# Patient Record
Sex: MALE | Race: WHITE | NOT HISPANIC OR LATINO | Employment: FULL TIME | URBAN - METROPOLITAN AREA
[De-identification: names, ages, dates, MRNs, and addresses within clinical notes are randomized per-mention and may not be internally consistent; named-entity substitution may affect disease eponyms.]

---

## 2017-11-07 ENCOUNTER — OFFICE VISIT (OUTPATIENT)
Dept: URGENT CARE | Facility: PHYSICIAN GROUP | Age: 57
End: 2017-11-07
Payer: COMMERCIAL

## 2017-11-07 VITALS
OXYGEN SATURATION: 98 % | HEIGHT: 69 IN | HEART RATE: 64 BPM | DIASTOLIC BLOOD PRESSURE: 64 MMHG | SYSTOLIC BLOOD PRESSURE: 112 MMHG | WEIGHT: 148 LBS | RESPIRATION RATE: 16 BRPM | BODY MASS INDEX: 21.92 KG/M2 | TEMPERATURE: 98.9 F

## 2017-11-07 DIAGNOSIS — R19.7 DIARRHEA, UNSPECIFIED TYPE: ICD-10-CM

## 2017-11-07 DIAGNOSIS — J06.9 VIRAL URI WITH COUGH: ICD-10-CM

## 2017-11-07 PROCEDURE — 99203 OFFICE O/P NEW LOW 30 MIN: CPT | Performed by: PHYSICIAN ASSISTANT

## 2017-11-07 ASSESSMENT — ENCOUNTER SYMPTOMS
MYALGIAS: 0
ABDOMINAL PAIN: 0
SORE THROAT: 0
DIZZINESS: 0
CHILLS: 0
SHORTNESS OF BREATH: 0
SINUS PAIN: 0
COUGH: 1
VOMITING: 0
DIARRHEA: 1
FEVER: 0
CONSTIPATION: 0
SPUTUM PRODUCTION: 0
BLOOD IN STOOL: 0
NAUSEA: 1
MUSCULOSKELETAL NEGATIVE: 1

## 2017-11-07 ASSESSMENT — PAIN SCALES - GENERAL: PAINLEVEL: NO PAIN

## 2017-11-07 NOTE — LETTER
November 7, 2017         Patient: Gustavo Prieto   YOB: 1960   Date of Visit: 11/7/2017           To Whom it May Concern:    Gustavo Prieto was seen in my clinic on 11/7/2017. Please excuse his absence from 11/6/17-11/7/17.    If you have any questions or concerns, please don't hesitate to call.        Sincerely,           Sara Guzman P.A.-C.  Electronically Signed

## 2017-11-07 NOTE — PROGRESS NOTES
"Subjective:      Gustavo Prieto is a 57 y.o. male who presents with URI (sore throat, fever, bodyaches, diarrhea, nausea, x 2 days)            URI    This is a new problem. The current episode started in the past 7 days (2 days). The problem has been unchanged. There has been no fever. Associated symptoms include congestion, coughing, diarrhea and nausea. Pertinent negatives include no abdominal pain, chest pain, ear pain, plugged ear sensation, sinus pain, sore throat or vomiting. He has tried nothing for the symptoms.     Patient denies history of pneumonia. He is a current every day smoker. No fevers or chills. He states he has had some body aches and nausea (without vomiting) since symptoms started. His girlfriend is currently sick with similar symptoms. No recent antibiotic use. Patient is not concerned about diarrhea and denies recent travel, recent hospitalization, or history of c diff infections.     Review of Systems   Constitutional: Negative for chills and fever.   HENT: Positive for congestion. Negative for ear pain and sore throat.    Respiratory: Positive for cough. Negative for sputum production and shortness of breath.    Cardiovascular: Negative for chest pain.   Gastrointestinal: Positive for diarrhea and nausea. Negative for abdominal pain, blood in stool, constipation and vomiting.   Genitourinary: Negative.    Musculoskeletal: Negative.  Negative for myalgias.   Neurological: Negative for dizziness.        Objective:     /64   Pulse 64   Temp 37.2 °C (98.9 °F)   Resp 16   Ht 1.753 m (5' 9\")   Wt 67.1 kg (148 lb)   SpO2 98%   BMI 21.86 kg/m²      Physical Exam   Constitutional: He is oriented to person, place, and time. He appears well-developed and well-nourished. No distress.   HENT:   Head: Normocephalic and atraumatic.   Right Ear: Hearing, tympanic membrane, external ear and ear canal normal.   Left Ear: Hearing, tympanic membrane, external ear and ear canal normal. "   Mouth/Throat: Oropharynx is clear and moist. No oropharyngeal exudate, posterior oropharyngeal edema or posterior oropharyngeal erythema.   Eyes: Conjunctivae are normal. Pupils are equal, round, and reactive to light. Right eye exhibits no discharge. Left eye exhibits no discharge.   Neck: Normal range of motion.   Cardiovascular: Normal rate, regular rhythm and normal heart sounds.    No murmur heard.  Pulmonary/Chest: Effort normal and breath sounds normal. No respiratory distress. He has no wheezes. He has no rales.   Abdominal: Soft. Bowel sounds are normal. He exhibits no distension. There is no tenderness.   Musculoskeletal: Normal range of motion.   Lymphadenopathy:     He has no cervical adenopathy.   Neurological: He is alert and oriented to person, place, and time.   Skin: Skin is warm and dry. He is not diaphoretic.   Psychiatric: He has a normal mood and affect. His behavior is normal.   Nursing note and vitals reviewed.         .  PMH:  has a past medical history of S/P colostomy (CMS-HCC) (1/2014).  MEDS:   Current Outpatient Prescriptions:   •  oxycodone, immediate release, (ROXICODONE) 5 MG TABS, Take 1 Tab by mouth every four hours as needed for Mild Pain., Disp: 30 Tab, Rfl: 0  •  DSS (COLACE) 100 MG CAPS, Take 100 mg by mouth 2 times a day as needed for Constipation., Disp: 30 Cap, Rfl: 0  ALLERGIES: No Known Allergies  SURGHX:   Past Surgical History:   Procedure Laterality Date   • COLOSTOMY TAKEDOWN  6/9/2014    Performed by Leopoldo Shrestha M.D. at SURGERY [facility]   • EXPLORATORY LAPAROTOMY  1/15/2014    Performed by Leopoldo Shrestha M.D. at SURGERY [facility]   • COLON RESECTION  1/15/2014    Performed by Leopoldo Shrestha M.D. at SURGERY [facility]     SOCHX:  reports that he quit smoking about 3 years ago. He started smoking about 40 years ago. He has a 17.50 pack-year smoking history. He has never used smokeless tobacco. He reports that he drinks alcohol. He reports that  he uses drugs.  FH: family history includes Diabetes in his father; Heart Attack in his father.       Assessment/Plan:     1. Viral URI with cough  Advised patient symptoms are most likely viral in etiology, recommend supportive care. Increased fluids and rest. Patient declines cough medication at today's visit. Encouraged to take OTC cough suppressants as needed for symptomatic relief. Call or return to office if symptoms persist or worsen. Work note given at today's visit. The patient demonstrated a good understanding and agreed with the treatment plan.    2. Diarrhea, unspecified type

## 2022-03-09 ENCOUNTER — HOSPITAL ENCOUNTER (OUTPATIENT)
Facility: MEDICAL CENTER | Age: 62
End: 2022-03-11
Attending: EMERGENCY MEDICINE | Admitting: STUDENT IN AN ORGANIZED HEALTH CARE EDUCATION/TRAINING PROGRAM

## 2022-03-09 ENCOUNTER — APPOINTMENT (OUTPATIENT)
Dept: RADIOLOGY | Facility: MEDICAL CENTER | Age: 62
End: 2022-03-09
Attending: EMERGENCY MEDICINE

## 2022-03-09 DIAGNOSIS — R11.2 NON-INTRACTABLE VOMITING WITH NAUSEA, UNSPECIFIED VOMITING TYPE: ICD-10-CM

## 2022-03-09 DIAGNOSIS — E87.1 HYPONATREMIA: ICD-10-CM

## 2022-03-09 DIAGNOSIS — K52.9 GASTROENTERITIS: ICD-10-CM

## 2022-03-09 DIAGNOSIS — E86.0 DEHYDRATION: ICD-10-CM

## 2022-03-09 DIAGNOSIS — N17.9 AKI (ACUTE KIDNEY INJURY) (HCC): ICD-10-CM

## 2022-03-09 PROBLEM — A41.9 SEPSIS (HCC): Status: ACTIVE | Noted: 2022-03-09

## 2022-03-09 PROBLEM — E87.20 LACTIC ACIDOSIS: Status: ACTIVE | Noted: 2022-03-09

## 2022-03-09 PROBLEM — F12.10 MARIJUANA ABUSE: Status: ACTIVE | Noted: 2022-03-09

## 2022-03-09 LAB
ALBUMIN SERPL BCP-MCNC: 5.1 G/DL (ref 3.2–4.9)
ALBUMIN/GLOB SERPL: 1.6 G/DL
ALP SERPL-CCNC: 101 U/L (ref 30–99)
ALT SERPL-CCNC: 17 U/L (ref 2–50)
ANION GAP SERPL CALC-SCNC: 20 MMOL/L (ref 7–16)
APPEARANCE UR: CLEAR
AST SERPL-CCNC: 20 U/L (ref 12–45)
BACTERIA #/AREA URNS HPF: NEGATIVE /HPF
BASOPHILS # BLD AUTO: 0.2 % (ref 0–1.8)
BASOPHILS # BLD: 0.04 K/UL (ref 0–0.12)
BILIRUB SERPL-MCNC: 1 MG/DL (ref 0.1–1.5)
BILIRUB UR QL STRIP.AUTO: NEGATIVE
BUN SERPL-MCNC: 29 MG/DL (ref 8–22)
CALCIUM SERPL-MCNC: 10.5 MG/DL (ref 8.5–10.5)
CHLORIDE SERPL-SCNC: 96 MMOL/L (ref 96–112)
CO2 SERPL-SCNC: 14 MMOL/L (ref 20–33)
COLOR UR: YELLOW
CREAT SERPL-MCNC: 1.74 MG/DL (ref 0.5–1.4)
EOSINOPHIL # BLD AUTO: 0.04 K/UL (ref 0–0.51)
EOSINOPHIL NFR BLD: 0.2 % (ref 0–6.9)
EPI CELLS #/AREA URNS HPF: NEGATIVE /HPF
ERYTHROCYTE [DISTWIDTH] IN BLOOD BY AUTOMATED COUNT: 45.1 FL (ref 35.9–50)
GLOBULIN SER CALC-MCNC: 3.1 G/DL (ref 1.9–3.5)
GLUCOSE SERPL-MCNC: 116 MG/DL (ref 65–99)
GLUCOSE UR STRIP.AUTO-MCNC: NEGATIVE MG/DL
HCT VFR BLD AUTO: 49.9 % (ref 42–52)
HGB BLD-MCNC: 17.3 G/DL (ref 14–18)
HYALINE CASTS #/AREA URNS LPF: ABNORMAL /LPF
IMM GRANULOCYTES # BLD AUTO: 0.07 K/UL (ref 0–0.11)
IMM GRANULOCYTES NFR BLD AUTO: 0.4 % (ref 0–0.9)
INR PPP: 1.23 (ref 0.87–1.13)
KETONES UR STRIP.AUTO-MCNC: NEGATIVE MG/DL
LACTATE BLD-SCNC: 1.7 MMOL/L (ref 0.5–2)
LACTATE BLD-SCNC: 3 MMOL/L (ref 0.5–2)
LACTATE BLD-SCNC: 8.1 MMOL/L (ref 0.5–2)
LEUKOCYTE ESTERASE UR QL STRIP.AUTO: NEGATIVE
LIPASE SERPL-CCNC: 82 U/L (ref 11–82)
LYMPHOCYTES # BLD AUTO: 4.11 K/UL (ref 1–4.8)
LYMPHOCYTES NFR BLD: 23 % (ref 22–41)
MCH RBC QN AUTO: 29.7 PG (ref 27–33)
MCHC RBC AUTO-ENTMCNC: 34.7 G/DL (ref 33.7–35.3)
MCV RBC AUTO: 85.6 FL (ref 81.4–97.8)
MICRO URNS: ABNORMAL
MONOCYTES # BLD AUTO: 1.65 K/UL (ref 0–0.85)
MONOCYTES NFR BLD AUTO: 9.2 % (ref 0–13.4)
NEUTROPHILS # BLD AUTO: 11.93 K/UL (ref 1.82–7.42)
NEUTROPHILS NFR BLD: 67 % (ref 44–72)
NITRITE UR QL STRIP.AUTO: NEGATIVE
NRBC # BLD AUTO: 0 K/UL
NRBC BLD-RTO: 0 /100 WBC
PH UR STRIP.AUTO: 5.5 [PH] (ref 5–8)
PLATELET # BLD AUTO: 445 K/UL (ref 164–446)
PMV BLD AUTO: 9.8 FL (ref 9–12.9)
POTASSIUM SERPL-SCNC: 4.9 MMOL/L (ref 3.6–5.5)
PROT SERPL-MCNC: 8.2 G/DL (ref 6–8.2)
PROT UR QL STRIP: NEGATIVE MG/DL
PROTHROMBIN TIME: 15.2 SEC (ref 12–14.6)
RBC # BLD AUTO: 5.83 M/UL (ref 4.7–6.1)
RBC # URNS HPF: ABNORMAL /HPF
RBC UR QL AUTO: ABNORMAL
SODIUM SERPL-SCNC: 130 MMOL/L (ref 135–145)
SP GR UR STRIP.AUTO: >=1.045
UROBILINOGEN UR STRIP.AUTO-MCNC: 0.2 MG/DL
WBC # BLD AUTO: 17.8 K/UL (ref 4.8–10.8)
WBC #/AREA URNS HPF: ABNORMAL /HPF

## 2022-03-09 PROCEDURE — 80053 COMPREHEN METABOLIC PANEL: CPT

## 2022-03-09 PROCEDURE — 96372 THER/PROPH/DIAG INJ SC/IM: CPT

## 2022-03-09 PROCEDURE — 700102 HCHG RX REV CODE 250 W/ 637 OVERRIDE(OP): Performed by: STUDENT IN AN ORGANIZED HEALTH CARE EDUCATION/TRAINING PROGRAM

## 2022-03-09 PROCEDURE — 700105 HCHG RX REV CODE 258: Performed by: STUDENT IN AN ORGANIZED HEALTH CARE EDUCATION/TRAINING PROGRAM

## 2022-03-09 PROCEDURE — 85025 COMPLETE CBC W/AUTO DIFF WBC: CPT

## 2022-03-09 PROCEDURE — 700101 HCHG RX REV CODE 250: Performed by: EMERGENCY MEDICINE

## 2022-03-09 PROCEDURE — 96365 THER/PROPH/DIAG IV INF INIT: CPT

## 2022-03-09 PROCEDURE — 36415 COLL VENOUS BLD VENIPUNCTURE: CPT

## 2022-03-09 PROCEDURE — 83605 ASSAY OF LACTIC ACID: CPT | Mod: 91

## 2022-03-09 PROCEDURE — 85610 PROTHROMBIN TIME: CPT

## 2022-03-09 PROCEDURE — 700111 HCHG RX REV CODE 636 W/ 250 OVERRIDE (IP): Performed by: EMERGENCY MEDICINE

## 2022-03-09 PROCEDURE — A9270 NON-COVERED ITEM OR SERVICE: HCPCS | Performed by: STUDENT IN AN ORGANIZED HEALTH CARE EDUCATION/TRAINING PROGRAM

## 2022-03-09 PROCEDURE — G0378 HOSPITAL OBSERVATION PER HR: HCPCS

## 2022-03-09 PROCEDURE — 74177 CT ABD & PELVIS W/CONTRAST: CPT

## 2022-03-09 PROCEDURE — 96375 TX/PRO/DX INJ NEW DRUG ADDON: CPT

## 2022-03-09 PROCEDURE — 81001 URINALYSIS AUTO W/SCOPE: CPT

## 2022-03-09 PROCEDURE — 83690 ASSAY OF LIPASE: CPT

## 2022-03-09 PROCEDURE — 99406 BEHAV CHNG SMOKING 3-10 MIN: CPT | Performed by: STUDENT IN AN ORGANIZED HEALTH CARE EDUCATION/TRAINING PROGRAM

## 2022-03-09 PROCEDURE — 700117 HCHG RX CONTRAST REV CODE 255: Performed by: EMERGENCY MEDICINE

## 2022-03-09 PROCEDURE — 99285 EMERGENCY DEPT VISIT HI MDM: CPT

## 2022-03-09 PROCEDURE — 700105 HCHG RX REV CODE 258: Performed by: EMERGENCY MEDICINE

## 2022-03-09 PROCEDURE — 71045 X-RAY EXAM CHEST 1 VIEW: CPT

## 2022-03-09 PROCEDURE — 99220 PR INITIAL OBSERVATION CARE,LEVL III: CPT | Mod: 25 | Performed by: STUDENT IN AN ORGANIZED HEALTH CARE EDUCATION/TRAINING PROGRAM

## 2022-03-09 PROCEDURE — 700111 HCHG RX REV CODE 636 W/ 250 OVERRIDE (IP): Performed by: STUDENT IN AN ORGANIZED HEALTH CARE EDUCATION/TRAINING PROGRAM

## 2022-03-09 PROCEDURE — 87040 BLOOD CULTURE FOR BACTERIA: CPT

## 2022-03-09 RX ORDER — HEPARIN SODIUM 5000 [USP'U]/ML
5000 INJECTION, SOLUTION INTRAVENOUS; SUBCUTANEOUS EVERY 8 HOURS
Status: DISCONTINUED | OUTPATIENT
Start: 2022-03-09 | End: 2022-03-11 | Stop reason: HOSPADM

## 2022-03-09 RX ORDER — ACETAMINOPHEN 325 MG/1
650 TABLET ORAL EVERY 6 HOURS PRN
Status: DISCONTINUED | OUTPATIENT
Start: 2022-03-09 | End: 2022-03-11 | Stop reason: HOSPADM

## 2022-03-09 RX ORDER — ONDANSETRON 2 MG/ML
4 INJECTION INTRAMUSCULAR; INTRAVENOUS EVERY 4 HOURS PRN
Status: DISCONTINUED | OUTPATIENT
Start: 2022-03-09 | End: 2022-03-11 | Stop reason: HOSPADM

## 2022-03-09 RX ORDER — BISACODYL 10 MG
10 SUPPOSITORY, RECTAL RECTAL
Status: DISCONTINUED | OUTPATIENT
Start: 2022-03-09 | End: 2022-03-11 | Stop reason: HOSPADM

## 2022-03-09 RX ORDER — ONDANSETRON 4 MG/1
4 TABLET, ORALLY DISINTEGRATING ORAL EVERY 4 HOURS PRN
Status: DISCONTINUED | OUTPATIENT
Start: 2022-03-09 | End: 2022-03-11 | Stop reason: HOSPADM

## 2022-03-09 RX ORDER — ONDANSETRON 2 MG/ML
4 INJECTION INTRAMUSCULAR; INTRAVENOUS ONCE
Status: COMPLETED | OUTPATIENT
Start: 2022-03-09 | End: 2022-03-09

## 2022-03-09 RX ORDER — METRONIDAZOLE 500 MG/1
500 TABLET ORAL EVERY 8 HOURS
Status: DISCONTINUED | OUTPATIENT
Start: 2022-03-10 | End: 2022-03-11 | Stop reason: HOSPADM

## 2022-03-09 RX ORDER — PROCHLORPERAZINE EDISYLATE 5 MG/ML
5-10 INJECTION INTRAMUSCULAR; INTRAVENOUS EVERY 4 HOURS PRN
Status: DISCONTINUED | OUTPATIENT
Start: 2022-03-09 | End: 2022-03-11 | Stop reason: HOSPADM

## 2022-03-09 RX ORDER — GUAIFENESIN/DEXTROMETHORPHAN 100-10MG/5
10 SYRUP ORAL EVERY 6 HOURS PRN
Status: DISCONTINUED | OUTPATIENT
Start: 2022-03-09 | End: 2022-03-11 | Stop reason: HOSPADM

## 2022-03-09 RX ORDER — POLYETHYLENE GLYCOL 3350 17 G/17G
1 POWDER, FOR SOLUTION ORAL
Status: DISCONTINUED | OUTPATIENT
Start: 2022-03-09 | End: 2022-03-11 | Stop reason: HOSPADM

## 2022-03-09 RX ORDER — PROMETHAZINE HYDROCHLORIDE 25 MG/1
12.5-25 SUPPOSITORY RECTAL EVERY 4 HOURS PRN
Status: DISCONTINUED | OUTPATIENT
Start: 2022-03-09 | End: 2022-03-11 | Stop reason: HOSPADM

## 2022-03-09 RX ORDER — MORPHINE SULFATE 4 MG/ML
4 INJECTION INTRAVENOUS ONCE
Status: COMPLETED | OUTPATIENT
Start: 2022-03-09 | End: 2022-03-09

## 2022-03-09 RX ORDER — LABETALOL HYDROCHLORIDE 5 MG/ML
10 INJECTION, SOLUTION INTRAVENOUS EVERY 4 HOURS PRN
Status: DISCONTINUED | OUTPATIENT
Start: 2022-03-09 | End: 2022-03-11 | Stop reason: HOSPADM

## 2022-03-09 RX ORDER — SODIUM CHLORIDE, SODIUM LACTATE, POTASSIUM CHLORIDE, AND CALCIUM CHLORIDE .6; .31; .03; .02 G/100ML; G/100ML; G/100ML; G/100ML
30 INJECTION, SOLUTION INTRAVENOUS ONCE
Status: COMPLETED | OUTPATIENT
Start: 2022-03-09 | End: 2022-03-09

## 2022-03-09 RX ORDER — PROMETHAZINE HYDROCHLORIDE 25 MG/1
12.5-25 TABLET ORAL EVERY 4 HOURS PRN
Status: DISCONTINUED | OUTPATIENT
Start: 2022-03-09 | End: 2022-03-11 | Stop reason: HOSPADM

## 2022-03-09 RX ORDER — AMOXICILLIN 250 MG
2 CAPSULE ORAL 2 TIMES DAILY
Status: DISCONTINUED | OUTPATIENT
Start: 2022-03-09 | End: 2022-03-11 | Stop reason: HOSPADM

## 2022-03-09 RX ORDER — SODIUM CHLORIDE, SODIUM LACTATE, POTASSIUM CHLORIDE, CALCIUM CHLORIDE 600; 310; 30; 20 MG/100ML; MG/100ML; MG/100ML; MG/100ML
INJECTION, SOLUTION INTRAVENOUS CONTINUOUS
Status: DISCONTINUED | OUTPATIENT
Start: 2022-03-09 | End: 2022-03-11

## 2022-03-09 RX ORDER — CEFTRIAXONE 2 G/1
2 INJECTION, POWDER, FOR SOLUTION INTRAMUSCULAR; INTRAVENOUS ONCE
Status: COMPLETED | OUTPATIENT
Start: 2022-03-09 | End: 2022-03-09

## 2022-03-09 RX ORDER — SODIUM CHLORIDE, SODIUM LACTATE, POTASSIUM CHLORIDE, AND CALCIUM CHLORIDE .6; .31; .03; .02 G/100ML; G/100ML; G/100ML; G/100ML
500 INJECTION, SOLUTION INTRAVENOUS
Status: DISCONTINUED | OUTPATIENT
Start: 2022-03-09 | End: 2022-03-11 | Stop reason: HOSPADM

## 2022-03-09 RX ADMIN — ONDANSETRON 4 MG: 2 INJECTION INTRAMUSCULAR; INTRAVENOUS at 20:16

## 2022-03-09 RX ADMIN — SODIUM CHLORIDE, POTASSIUM CHLORIDE, SODIUM LACTATE AND CALCIUM CHLORIDE 1899 ML: 600; 310; 30; 20 INJECTION, SOLUTION INTRAVENOUS at 20:15

## 2022-03-09 RX ADMIN — MORPHINE SULFATE 4 MG: 4 INJECTION INTRAVENOUS at 20:16

## 2022-03-09 RX ADMIN — SODIUM CHLORIDE, POTASSIUM CHLORIDE, SODIUM LACTATE AND CALCIUM CHLORIDE: 600; 310; 30; 20 INJECTION, SOLUTION INTRAVENOUS at 23:46

## 2022-03-09 RX ADMIN — METRONIDAZOLE 500 MG: 500 INJECTION, SOLUTION INTRAVENOUS at 20:15

## 2022-03-09 RX ADMIN — CEFTRIAXONE SODIUM 2 G: 2 INJECTION, POWDER, FOR SOLUTION INTRAMUSCULAR; INTRAVENOUS at 20:15

## 2022-03-09 RX ADMIN — ONDANSETRON 4 MG: 4 TABLET, ORALLY DISINTEGRATING ORAL at 23:44

## 2022-03-09 RX ADMIN — HEPARIN SODIUM 5000 UNITS: 5000 INJECTION, SOLUTION INTRAVENOUS; SUBCUTANEOUS at 23:45

## 2022-03-09 RX ADMIN — PROMETHAZINE HYDROCHLORIDE 25 MG: 25 TABLET ORAL at 23:55

## 2022-03-09 RX ADMIN — IOHEXOL 80 ML: 350 INJECTION, SOLUTION INTRAVENOUS at 20:52

## 2022-03-09 ASSESSMENT — ENCOUNTER SYMPTOMS
FALLS: 0
BLOOD IN STOOL: 0
PALPITATIONS: 0
HEADACHES: 0
COUGH: 0
CHILLS: 0
FLANK PAIN: 0
DOUBLE VISION: 0
VOMITING: 1
NAUSEA: 1
DIARRHEA: 0
CONSTIPATION: 0
HEARTBURN: 1
SHORTNESS OF BREATH: 0
DIZZINESS: 0
BRUISES/BLEEDS EASILY: 0
DEPRESSION: 0
MYALGIAS: 0
BLURRED VISION: 0
FEVER: 1
FOCAL WEAKNESS: 0
ABDOMINAL PAIN: 1
WEAKNESS: 0

## 2022-03-09 ASSESSMENT — LIFESTYLE VARIABLES: SUBSTANCE_ABUSE: 1

## 2022-03-10 PROBLEM — N17.9 AKI (ACUTE KIDNEY INJURY) (HCC): Status: ACTIVE | Noted: 2022-03-10

## 2022-03-10 PROBLEM — E87.1 HYPONATREMIA: Status: ACTIVE | Noted: 2022-03-10

## 2022-03-10 LAB
ALBUMIN SERPL BCP-MCNC: 4 G/DL (ref 3.2–4.9)
ALBUMIN/GLOB SERPL: 1.6 G/DL
ALP SERPL-CCNC: 78 U/L (ref 30–99)
ALT SERPL-CCNC: 14 U/L (ref 2–50)
ANION GAP SERPL CALC-SCNC: 14 MMOL/L (ref 7–16)
AST SERPL-CCNC: 16 U/L (ref 12–45)
BASOPHILS # BLD AUTO: 0.2 % (ref 0–1.8)
BASOPHILS # BLD: 0.03 K/UL (ref 0–0.12)
BILIRUB SERPL-MCNC: 0.7 MG/DL (ref 0.1–1.5)
BUN SERPL-MCNC: 24 MG/DL (ref 8–22)
CALCIUM SERPL-MCNC: 8.6 MG/DL (ref 8.5–10.5)
CHLORIDE SERPL-SCNC: 100 MMOL/L (ref 96–112)
CO2 SERPL-SCNC: 18 MMOL/L (ref 20–33)
CREAT SERPL-MCNC: 1.28 MG/DL (ref 0.5–1.4)
EOSINOPHIL # BLD AUTO: 0.03 K/UL (ref 0–0.51)
EOSINOPHIL NFR BLD: 0.2 % (ref 0–6.9)
ERYTHROCYTE [DISTWIDTH] IN BLOOD BY AUTOMATED COUNT: 46 FL (ref 35.9–50)
FLUAV RNA SPEC QL NAA+PROBE: NEGATIVE
FLUBV RNA SPEC QL NAA+PROBE: NEGATIVE
GLOBULIN SER CALC-MCNC: 2.5 G/DL (ref 1.9–3.5)
GLUCOSE SERPL-MCNC: 112 MG/DL (ref 65–99)
HCT VFR BLD AUTO: 43.6 % (ref 42–52)
HGB BLD-MCNC: 15 G/DL (ref 14–18)
IMM GRANULOCYTES # BLD AUTO: 0.1 K/UL (ref 0–0.11)
IMM GRANULOCYTES NFR BLD AUTO: 0.6 % (ref 0–0.9)
LACTATE BLD-SCNC: 1.4 MMOL/L (ref 0.5–2)
LACTATE BLD-SCNC: 1.8 MMOL/L (ref 0.5–2)
LYMPHOCYTES # BLD AUTO: 2.89 K/UL (ref 1–4.8)
LYMPHOCYTES NFR BLD: 17.3 % (ref 22–41)
MAGNESIUM SERPL-MCNC: 1.9 MG/DL (ref 1.5–2.5)
MCH RBC QN AUTO: 30.1 PG (ref 27–33)
MCHC RBC AUTO-ENTMCNC: 34.4 G/DL (ref 33.7–35.3)
MCV RBC AUTO: 87.4 FL (ref 81.4–97.8)
MONOCYTES # BLD AUTO: 1.54 K/UL (ref 0–0.85)
MONOCYTES NFR BLD AUTO: 9.2 % (ref 0–13.4)
NEUTROPHILS # BLD AUTO: 12.12 K/UL (ref 1.82–7.42)
NEUTROPHILS NFR BLD: 72.5 % (ref 44–72)
NRBC # BLD AUTO: 0 K/UL
NRBC BLD-RTO: 0 /100 WBC
PHOSPHATE SERPL-MCNC: 3.8 MG/DL (ref 2.5–4.5)
PLATELET # BLD AUTO: 355 K/UL (ref 164–446)
PMV BLD AUTO: 10 FL (ref 9–12.9)
POTASSIUM SERPL-SCNC: 4.2 MMOL/L (ref 3.6–5.5)
PROCALCITONIN SERPL-MCNC: <0.05 NG/ML
PROT SERPL-MCNC: 6.5 G/DL (ref 6–8.2)
RBC # BLD AUTO: 4.99 M/UL (ref 4.7–6.1)
SARS-COV-2 RNA RESP QL NAA+PROBE: NOTDETECTED
SODIUM SERPL-SCNC: 132 MMOL/L (ref 135–145)
SPECIMEN SOURCE: NORMAL
WBC # BLD AUTO: 16.7 K/UL (ref 4.8–10.8)

## 2022-03-10 PROCEDURE — C9803 HOPD COVID-19 SPEC COLLECT: HCPCS | Performed by: INTERNAL MEDICINE

## 2022-03-10 PROCEDURE — G0378 HOSPITAL OBSERVATION PER HR: HCPCS

## 2022-03-10 PROCEDURE — 700105 HCHG RX REV CODE 258: Performed by: STUDENT IN AN ORGANIZED HEALTH CARE EDUCATION/TRAINING PROGRAM

## 2022-03-10 PROCEDURE — 96376 TX/PRO/DX INJ SAME DRUG ADON: CPT

## 2022-03-10 PROCEDURE — A9270 NON-COVERED ITEM OR SERVICE: HCPCS | Performed by: STUDENT IN AN ORGANIZED HEALTH CARE EDUCATION/TRAINING PROGRAM

## 2022-03-10 PROCEDURE — 96375 TX/PRO/DX INJ NEW DRUG ADDON: CPT

## 2022-03-10 PROCEDURE — 84145 PROCALCITONIN (PCT): CPT

## 2022-03-10 PROCEDURE — 83605 ASSAY OF LACTIC ACID: CPT

## 2022-03-10 PROCEDURE — 83735 ASSAY OF MAGNESIUM: CPT

## 2022-03-10 PROCEDURE — 96372 THER/PROPH/DIAG INJ SC/IM: CPT

## 2022-03-10 PROCEDURE — 700102 HCHG RX REV CODE 250 W/ 637 OVERRIDE(OP): Performed by: STUDENT IN AN ORGANIZED HEALTH CARE EDUCATION/TRAINING PROGRAM

## 2022-03-10 PROCEDURE — 80053 COMPREHEN METABOLIC PANEL: CPT

## 2022-03-10 PROCEDURE — 85025 COMPLETE CBC W/AUTO DIFF WBC: CPT

## 2022-03-10 PROCEDURE — 84100 ASSAY OF PHOSPHORUS: CPT

## 2022-03-10 PROCEDURE — 700111 HCHG RX REV CODE 636 W/ 250 OVERRIDE (IP): Performed by: STUDENT IN AN ORGANIZED HEALTH CARE EDUCATION/TRAINING PROGRAM

## 2022-03-10 PROCEDURE — 0240U HCHG SARS-COV-2 COVID-19 NFCT DS RESP RNA 3 TRGT MIC: CPT

## 2022-03-10 PROCEDURE — 99225 PR SUBSEQUENT OBSERVATION CARE,LEVEL II: CPT | Performed by: INTERNAL MEDICINE

## 2022-03-10 RX ORDER — NICOTINE 21 MG/24HR
21 PATCH, TRANSDERMAL 24 HOURS TRANSDERMAL
Status: DISCONTINUED | OUTPATIENT
Start: 2022-03-11 | End: 2022-03-11 | Stop reason: HOSPADM

## 2022-03-10 RX ADMIN — HEPARIN SODIUM 5000 UNITS: 5000 INJECTION, SOLUTION INTRAVENOUS; SUBCUTANEOUS at 13:34

## 2022-03-10 RX ADMIN — METRONIDAZOLE 500 MG: 500 TABLET ORAL at 06:26

## 2022-03-10 RX ADMIN — HEPARIN SODIUM 5000 UNITS: 5000 INJECTION, SOLUTION INTRAVENOUS; SUBCUTANEOUS at 21:46

## 2022-03-10 RX ADMIN — HEPARIN SODIUM 5000 UNITS: 5000 INJECTION, SOLUTION INTRAVENOUS; SUBCUTANEOUS at 06:26

## 2022-03-10 RX ADMIN — SODIUM CHLORIDE, POTASSIUM CHLORIDE, SODIUM LACTATE AND CALCIUM CHLORIDE: 600; 310; 30; 20 INJECTION, SOLUTION INTRAVENOUS at 12:20

## 2022-03-10 RX ADMIN — METRONIDAZOLE 500 MG: 500 TABLET ORAL at 21:47

## 2022-03-10 RX ADMIN — METRONIDAZOLE 500 MG: 500 TABLET ORAL at 13:34

## 2022-03-10 RX ADMIN — CEFTRIAXONE SODIUM 1 G: 10 INJECTION, POWDER, FOR SOLUTION INTRAVENOUS at 06:25

## 2022-03-10 RX ADMIN — PROCHLORPERAZINE EDISYLATE 10 MG: 5 INJECTION INTRAMUSCULAR; INTRAVENOUS at 02:55

## 2022-03-10 RX ADMIN — SODIUM CHLORIDE, POTASSIUM CHLORIDE, SODIUM LACTATE AND CALCIUM CHLORIDE: 600; 310; 30; 20 INJECTION, SOLUTION INTRAVENOUS at 21:42

## 2022-03-10 ASSESSMENT — COGNITIVE AND FUNCTIONAL STATUS - GENERAL
SUGGESTED CMS G CODE MODIFIER DAILY ACTIVITY: CH
DAILY ACTIVITIY SCORE: 24

## 2022-03-10 ASSESSMENT — PAIN DESCRIPTION - PAIN TYPE
TYPE: ACUTE PAIN
TYPE: ACUTE PAIN

## 2022-03-10 ASSESSMENT — PATIENT HEALTH QUESTIONNAIRE - PHQ9
1. LITTLE INTEREST OR PLEASURE IN DOING THINGS: NOT AT ALL
2. FEELING DOWN, DEPRESSED, IRRITABLE, OR HOPELESS: NOT AT ALL
SUM OF ALL RESPONSES TO PHQ9 QUESTIONS 1 AND 2: 0

## 2022-03-10 ASSESSMENT — LIFESTYLE VARIABLES
ALCOHOL_USE: YES
EVER HAD A DRINK FIRST THING IN THE MORNING TO STEADY YOUR NERVES TO GET RID OF A HANGOVER: NO
CONSUMPTION TOTAL: INCOMPLETE
TOTAL SCORE: 0
DOES PATIENT WANT TO STOP DRINKING: NO
HAVE YOU EVER FELT YOU SHOULD CUT DOWN ON YOUR DRINKING: NO
TOTAL SCORE: 0
EVER FELT BAD OR GUILTY ABOUT YOUR DRINKING: NO
HAVE PEOPLE ANNOYED YOU BY CRITICIZING YOUR DRINKING: NO
TOTAL SCORE: 0

## 2022-03-10 NOTE — PROGRESS NOTES
[facility] Medicine Daily Progress Note    Date of Service  3/10/2022    Chief Complaint  Gustavo Prieto is a 61 y.o. male admitted 3/9/2022 with Vomiting (For the last couple of days, haven't been able to keep anything done. ) and Chills (Started Monday)      Hospital Course  No notes on file  He has history of  prior diverticulitis, marijuana hyperemesis disorder, tobacco abuse.  He presents with Vomiting (For the last couple of days, haven't been able to keep anything done. ) and Chills (Started Monday)  At the ED, he is afebrile, sightly hypertensive  CT imagingshowed enteritis  Leukocytosis  He is admitted to observation for gastroenteritis      Interval Problem Update  3/10. He was feeling better but felt he wasn't at baseline. Blood cultures pending    I have personally seen and examined the patient at bedside. I discussed the plan of care with patient and bedside RN.    Consultants/Specialty      Code Status  Full Code    Disposition  Patient is medically cleared pending tolerating food, improved leukocytosis and blood culture results for discharge.   Anticipate discharge to to home with close outpatient follow-up.  I have placed the appropriate orders for post-discharge needs.    Review of Systems  Review of Systems   Constitutional: Positive for chills and malaise/fatigue.   Gastrointestinal: Positive for nausea and vomiting.        Physical Exam  Temp:  [36.2 °C (97.1 °F)] 36.2 °C (97.1 °F)  Pulse:  [59-91] 74  Resp:  [18] 18  BP: (130-154)/(75-93) 130/80  SpO2:  [93 %-98 %] 93 %    Physical Exam  Vitals and nursing note reviewed.   Constitutional:       Comments: malaised   HENT:      Head: Normocephalic and atraumatic.      Right Ear: External ear normal.      Left Ear: External ear normal.      Nose: Nose normal.      Mouth/Throat:      Mouth: Mucous membranes are moist.   Eyes:      General: No scleral icterus.     Conjunctiva/sclera: Conjunctivae normal.   Cardiovascular:      Rate and Rhythm:  Normal rate and regular rhythm.      Heart sounds: No murmur heard.    No friction rub. No gallop.   Pulmonary:      Effort: Pulmonary effort is normal.      Breath sounds: Normal breath sounds.   Abdominal:      General: Abdomen is flat. Bowel sounds are normal. There is no distension.      Palpations: Abdomen is soft.      Tenderness: There is no abdominal tenderness. There is no guarding.   Musculoskeletal:         General: Normal range of motion.      Cervical back: Normal range of motion and neck supple.   Skin:     General: Skin is warm.   Neurological:      Mental Status: He is alert and oriented to person, place, and time. Mental status is at baseline.   Psychiatric:         Mood and Affect: Mood normal.         Behavior: Behavior normal.         Thought Content: Thought content normal.         Judgment: Judgment normal.         Fluids    Intake/Output Summary (Last 24 hours) at 3/10/2022 0809  Last data filed at 3/10/2022 0300  Gross per 24 hour   Intake --   Output 500 ml   Net -500 ml       Laboratory  Recent Labs     03/09/22  1847 03/10/22  0255   WBC 17.8* 16.7*   RBC 5.83 4.99   HEMOGLOBIN 17.3 15.0   HEMATOCRIT 49.9 43.6   MCV 85.6 87.4   MCH 29.7 30.1   MCHC 34.7 34.4   RDW 45.1 46.0   PLATELETCT 445 355   MPV 9.8 10.0     Recent Labs     03/09/22  1847 03/10/22  0255   SODIUM 130* 132*   POTASSIUM 4.9 4.2   CHLORIDE 96 100   CO2 14* 18*   GLUCOSE 116* 112*   BUN 29* 24*   CREATININE 1.74* 1.28   CALCIUM 10.5 8.6     Recent Labs     03/09/22  2150   INR 1.23*               Imaging  CT-ABDOMEN-PELVIS WITH   Final Result      1.  Mildly dilated fluid-filled jejunal loops in the left upper quadrant may represent enteritis. No surrounding inflammation, pneumoperitoneum, or abscess is identified.      2.  Atherosclerosis      DX-CHEST-PORTABLE (1 VIEW)   Final Result      No evidence of acute cardiopulmonary process.           Assessment/Plan  * Sepsis, gastroenteritis (HCC)  Assessment & Plan  This is  "Sepsis Present on admission  SIRS criteria identified on my evaluation include: Leukocytosis, with WBC greater than 12,000 and Bandemia, greater than 10% bands  Source is GI  Sepsis protocol initiated  Fluid resuscitation ordered per protocol  Crystalloid Fluid Administration: Fluid resuscitation ordered per standard protocol - 30 mL/kg per current or ideal body weight  IV antibiotics as appropriate for source of sepsis  Reassessment: I have reassessed the patient's hemodynamic status\"    Continue antibiotics, switch to PO when he can tolerate food          Dehydration  Assessment & Plan  IVF    Lactic acidosis  Assessment & Plan  IVF, abx    Marijuana abuse  Assessment & Plan  Cessation advised    Intractable nausea and vomiting  Assessment & Plan  Supportive care  Discussed marijuana cessation    Gastroenteritis- (present on admission)  Assessment & Plan  CT AP: enteritis  IVF  Abx: ceftriaxone + flagyl  CLD - advance diet as tolerated\"    Advancing diet    Tobacco use- (present on admission)  Assessment & Plan  Admits to smoking 0.5 to 1ppd  Cessation counseled 5mins, education provided, declined offered nicotine patch/gum    Leukocytosis- (present on admission)  Assessment & Plan  Repeat in AM\"    Follow blood cultures  May discharge if leukocytosis trending down tomorrow         VTE prophylaxis: heparin ppx    I have performed a physical exam and reviewed and updated ROS and Plan today (3/10/2022). In review of yesterday's note (3/9/2022), there are no changes except as documented above.      "

## 2022-03-10 NOTE — ED TRIAGE NOTES
"Chief Complaint   Patient presents with   • Vomiting     For the last couple of days, haven't been able to keep anything done.    • Chills     Started Monday     Pt states he's had hyperemesis from cannabis use in the past but he also says stress has triggered it. Says he's been under a lot of stress recently. Smoked 3-4 days ago.     /93   Pulse 91   Temp 36.2 °C (97.1 °F) (Temporal)   Resp 18   Ht 1.753 m (5' 9\")   Wt 63.3 kg (139 lb 8.8 oz)   SpO2 98%   BMI 20.61 kg/m²     "

## 2022-03-10 NOTE — PROGRESS NOTES
Assumed care of pt. VSS on RA. Denies pain. Reports nausea. zofran, phenergan given. Lactate sent. LR initiated. Resting comfortably on stretcher. Call bell within reach, fall and safety precautions in place.

## 2022-03-10 NOTE — ASSESSMENT & PLAN NOTE
Cessation advised  Patient reports he formally consumed 1 g of marijuana daily, now is 1 g every 3 days.

## 2022-03-10 NOTE — ASSESSMENT & PLAN NOTE
"This is Sepsis Present on admission  SIRS criteria identified on my evaluation include: Leukocytosis, with WBC greater than 12,000 and Bandemia, greater than 10% bands  Source is GI  Sepsis protocol initiated  Fluid resuscitation ordered per protocol  Crystalloid Fluid Administration: Fluid resuscitation ordered per standard protocol - 30 mL/kg per current or ideal body weight  IV antibiotics as appropriate for source of sepsis  Reassessment: I have reassessed the patient's hemodynamic status\"    Improving  Advance diet as tolerated  "

## 2022-03-10 NOTE — ED NOTES
Received call from lab regarding critical result. Lactic 8.1. Read back and verified. ERP at bedside and made aware

## 2022-03-10 NOTE — H&P
Hospital Medicine History & Physical Note    Date of Service  3/9/2022    Primary Care Physician  Pcp Pt States None    Consultants  None    Code Status  Full Code    Chief Complaint  Chief Complaint   Patient presents with   • Vomiting     For the last couple of days, haven't been able to keep anything done.    • Chills     Started Monday       History of Presenting Illness  Gustavo Prieto is a 61 y.o. male with history of prior diverticulitis, marijuana hyperemesis disorder, tobacco abuse who presented 3/9/2022 with 3 days of nausea vomit abdominal discomfort.  Patient reported not having symptomatic relief, endorsed subjective fever at home.  Denies sick contact.  He reported being vaccinated for Covid with [employer] vaccine, also reported to have Pfizer booster.  However, he reported being sick with COVID approximately 2 weeks ago, but reported he has recovered since.  However due to nausea vomiting abdominal discomfort for the past 3 days decided come to ER for evaluation.  He reported consuming 1 g of marijuana daily, however recently reduced to 1 g every 3 days.  In ER, leukocytosis WBC of 17.8k, lactic acid 3.0.  CTAP noted enteritis, no diverticulitis.  Admitted to medicine service for further evaluation and treatment.    I discussed the plan of care with patient, family and bedside RN.    Review of Systems  Review of Systems   Constitutional: Positive for fever and malaise/fatigue. Negative for chills.   HENT: Negative for hearing loss and tinnitus.    Eyes: Negative for blurred vision and double vision.   Respiratory: Negative for cough and shortness of breath.    Cardiovascular: Negative for chest pain and palpitations.   Gastrointestinal: Positive for abdominal pain, heartburn, nausea and vomiting. Negative for blood in stool, constipation and diarrhea.   Genitourinary: Negative for dysuria and flank pain.   Musculoskeletal: Negative for falls and myalgias.   Skin: Negative for itching  and rash.   Neurological: Negative for dizziness, focal weakness, weakness and headaches.   Endo/Heme/Allergies: Negative for environmental allergies. Does not bruise/bleed easily.   Psychiatric/Behavioral: Positive for substance abuse (marijuana). Negative for depression and suicidal ideas.   All other systems reviewed and are negative.      Past Medical History   has a past medical history of S/P colostomy (CMS-HCC) ([facility]) (1/2014).    Surgical History   has a past surgical history that includes exploratory laparotomy (1/15/2014); colon resection (1/15/2014); and colostomy takedown (6/9/2014).     Family History  family history includes Diabetes in his father; Heart Attack in his father.   Family history reviewed with patient. There is no family history that is pertinent to the chief complaint.     Social History   reports that he quit smoking about 8 years ago. He started smoking about 44 years ago. He has a 17.50 pack-year smoking history. He has never used smokeless tobacco. He reports current alcohol use. He reports current drug use.    Allergies  No Known Allergies    Medications  Prior to Admission Medications   Prescriptions Last Dose Informant Patient Reported? Taking?   DSS (COLACE) 100 MG CAPS   No No   Sig: Take 100 mg by mouth 2 times a day as needed for Constipation.   oxycodone, immediate release, (ROXICODONE) 5 MG TABS   No No   Sig: Take 1 Tab by mouth every four hours as needed for Mild Pain.      Facility-Administered Medications: None       Physical Exam  Temp:  [36.2 °C (97.1 °F)] 36.2 °C (97.1 °F)  Pulse:  [91] 91  Resp:  [18] 18  BP: (154)/(93) 154/93  SpO2:  [98 %] 98 %  Blood Pressure: 154/93   Temperature: 36.2 °C (97.1 °F)   Pulse: 91   Respiration: 18   Pulse Oximetry: 98 %       Physical Exam  Vitals and nursing note reviewed.   Constitutional:       Appearance: Normal appearance.   HENT:      Head: Normocephalic and atraumatic.      Nose: Nose normal.      Mouth/Throat:      Mouth:  Mucous membranes are dry.      Pharynx: Oropharynx is clear.   Eyes:      General: No scleral icterus.     Extraocular Movements: Extraocular movements intact.   Cardiovascular:      Rate and Rhythm: Normal rate and regular rhythm.      Pulses: Normal pulses.      Heart sounds:     No friction rub.   Pulmonary:      Effort: Pulmonary effort is normal. No respiratory distress.      Breath sounds: No wheezing.   Abdominal:      General: Bowel sounds are normal. There is no distension.      Palpations: Abdomen is soft.      Tenderness: There is abdominal tenderness. There is no guarding or rebound.   Musculoskeletal:         General: No swelling or tenderness. Normal range of motion.      Cervical back: Neck supple. No tenderness.   Skin:     General: Skin is warm and dry.      Capillary Refill: Capillary refill takes less than 2 seconds.   Neurological:      General: No focal deficit present.      Mental Status: He is alert and oriented to person, place, and time.      Motor: No weakness.   Psychiatric:         Mood and Affect: Mood normal.         Laboratory:  Recent Labs     03/09/22  1847   WBC 17.8*   RBC 5.83   HEMOGLOBIN 17.3   HEMATOCRIT 49.9   MCV 85.6   MCH 29.7   MCHC 34.7   RDW 45.1   PLATELETCT 445   MPV 9.8     Recent Labs     03/09/22  1847   SODIUM 130*   POTASSIUM 4.9   CHLORIDE 96   CO2 14*   GLUCOSE 116*   BUN 29*   CREATININE 1.74*   CALCIUM 10.5     Recent Labs     03/09/22  1847   ALTSGPT 17   ASTSGOT 20   ALKPHOSPHAT 101*   TBILIRUBIN 1.0   LIPASE 82   GLUCOSE 116*     Recent Labs     03/09/22  2150   INR 1.23*     No results for input(s): NTPROBNP in the last 72 hours.      No results for input(s): TROPONINT in the last 72 hours.    Imaging:  CT-ABDOMEN-PELVIS WITH   Final Result      1.  Mildly dilated fluid-filled jejunal loops in the left upper quadrant may represent enteritis. No surrounding inflammation, pneumoperitoneum, or abscess is identified.      2.  Atherosclerosis       DX-CHEST-PORTABLE (1 VIEW)   Final Result      No evidence of acute cardiopulmonary process.          X-Ray:  I have personally reviewed the images and compared with prior images.    Assessment/Plan:  I anticipate this patient is appropriate for observation status at this time.    * Gastroenteritis- (present on admission)  Assessment & Plan  CT AP: enteritis  IVF  Abx: ceftriaxone + flagyl  CLD - advance diet as tolerated    Lactic acidosis  Assessment & Plan  IVF, abx    Sepsis (HCC)  Assessment & Plan  This is Sepsis Present on admission  SIRS criteria identified on my evaluation include: Leukocytosis, with WBC greater than 12,000 and Bandemia, greater than 10% bands  Source is GI  Sepsis protocol initiated  Fluid resuscitation ordered per protocol  Crystalloid Fluid Administration: Fluid resuscitation ordered per standard protocol - 30 mL/kg per current or ideal body weight  IV antibiotics as appropriate for source of sepsis  Reassessment: I have reassessed the patient's hemodynamic status          Dehydration  Assessment & Plan  IVF    Leukocytosis- (present on admission)  Assessment & Plan  Repeat in AM    Marijuana abuse  Assessment & Plan  Cessation advised    Intractable nausea and vomiting  Assessment & Plan  Supportive care  Discussed marijuana cessation    Tobacco use- (present on admission)  Assessment & Plan  Admits to smoking 0.5 to 1ppd  Cessation counseled 5mins, education provided, declined offered nicotine patch/gum      VTE prophylaxis: heparin ppx

## 2022-03-10 NOTE — ASSESSMENT & PLAN NOTE
Supportive care  Discussed marijuana cessatio  Patient reports he formally consumed 1 g of marijuana daily, now is 1 g every 3 days.   Resolving

## 2022-03-10 NOTE — ED PROVIDER NOTES
ED Provider Note    Scribed for Judith Glez M.D. by Vitaly Liz. 3/9/2022  7:51 PM    Primary care provider: Patient reports no primary care  Means of arrival: Walk-In  History obtained from: Patient  History limited by: None    CHIEF COMPLAINT  Chief Complaint   Patient presents with    Vomiting     For the last couple of days, haven't been able to keep anything done.     Chills     Started Monday     HPI  Gustavo Prieto is a 61 y.o. male who presents to the Emergency Department for worsening vomiting onset 3 days ago. Patient reports a history of divitculitis and cannabinoid hyperemesis syndrome. He reports a history of occasional use of alcohol and marijuana but denies history of any other drug use. Patient reports still having his gallbladder and appendix. He reports not being able to keep anything down due to the vomiting for the last several days. Due to this, the patient was prompted to present to the ED. Patient reports associated fever, urinary retention, chills, and diarrhea. There are no alleviating or exacerbating factors reported at this time. He denies associated abdominal pain or hematuria.    REVIEW OF SYSTEMS  GI: Positive for vomiting, diarrhea. Negative for abdominal pain.  : Positive for urinary retention. Negative for hematuria.  Endocrine: Positive for fever, chills.    See history of present illness. All other systems are negative. C.    PAST MEDICAL HISTORY   has a past medical history of S/P colostomy (CMS-HCC) (HCC) (2014).    SURGICAL HISTORY   has a past surgical history that includes exploratory laparotomy (1/15/2014); colon resection (1/15/2014); and colostomy takedown (2014).    SOCIAL HISTORY  Social History     Tobacco Use    Smoking status: Former Smoker     Packs/day: 0.50     Years: 35.00     Pack years: 17.50     Start date: 1977     Quit date: 2014     Years since quittin.1    Smokeless tobacco: Never Used   Substance Use Topics    Alcohol use: Yes  "    Comment: occ    Drug use: Yes     Comment: marijuana 4days ago      Social History     Substance and Sexual Activity   Drug Use Yes    Comment: marijuana 4days ago       FAMILY HISTORY  Family History   Problem Relation Age of Onset    Diabetes Father     Heart Attack Father        CURRENT MEDICATIONS  Home Medications       Reviewed by Karan Ng ([facility]) on 03/09/22 at 2232  Med List Status: Complete     Medication Last Dose Status        Patient Sha Taking any Medications                           ALLERGIES  No Known Allergies    PHYSICAL EXAM  VITAL SIGNS: /93   Pulse 91   Temp 36.2 °C (97.1 °F) (Temporal)   Resp 18   Ht 1.753 m (5' 9\")   Wt 63.3 kg (139 lb 8.8 oz)   SpO2 98%   BMI 20.61 kg/m²     Constitutional: Ill appearing, mild distress.  Dehydrated  HEENT: Normocephalic, Atraumatic,  external ears normal, pharynx pink,  Dry mucus membranes, No rhinorrhea or mucosal edema  Eyes: PERRL, EOMI, Conjunctiva normal, No discharge.   Neck: Normal range of motion, No tenderness, Supple, No stridor.   Lymphatic: No lymphadenopathy    Cardiovascular: Regular Rate and Rhythm, No murmurs,  rubs, or gallops.   Thorax & Lungs: Lungs clear to auscultation bilaterally, No respiratory distress, No wheezes, rhales or rhonchi, No chest wall tenderness.   Abdomen: Bowel sounds normal, soft. Mild diffuse tenderness noted. Non distended,  No pulsatile masses., no rebound guarding or peritoneal signs.   Skin: Warm, Dry, No erythema, No rash,   Back:  No CVA tenderness,  No spinal tenderness, bony crepitance, step offs, or instability.   Neurologic: Alert & oriented clear speech no focal deficits  Extremities: Equal, intact distal pulses, No cyanosis, clubbing or edema,  No tenderness.   Musculoskeletal: Good range of motion in all major joints. No tenderness to palpation or major deformities noted.     DIAGNOSTIC STUDIES / PROCEDURES    LABS  Results for orders placed or performed during the " hospital encounter of 03/09/22   CBC WITH DIFFERENTIAL   Result Value Ref Range    WBC 17.8 (H) 4.8 - 10.8 K/uL    RBC 5.83 4.70 - 6.10 M/uL    Hemoglobin 17.3 14.0 - 18.0 g/dL    Hematocrit 49.9 42.0 - 52.0 %    MCV 85.6 81.4 - 97.8 fL    MCH 29.7 27.0 - 33.0 pg    MCHC 34.7 33.7 - 35.3 g/dL    RDW 45.1 35.9 - 50.0 fL    Platelet Count 445 164 - 446 K/uL    MPV 9.8 9.0 - 12.9 fL    Neutrophils-Polys 67.00 44.00 - 72.00 %    Lymphocytes 23.00 22.00 - 41.00 %    Monocytes 9.20 0.00 - 13.40 %    Eosinophils 0.20 0.00 - 6.90 %    Basophils 0.20 0.00 - 1.80 %    Immature Granulocytes 0.40 0.00 - 0.90 %    Nucleated RBC 0.00 /100 WBC    Neutrophils (Absolute) 11.93 (H) 1.82 - 7.42 K/uL    Lymphs (Absolute) 4.11 1.00 - 4.80 K/uL    Monos (Absolute) 1.65 (H) 0.00 - 0.85 K/uL    Eos (Absolute) 0.04 0.00 - 0.51 K/uL    Baso (Absolute) 0.04 0.00 - 0.12 K/uL    Immature Granulocytes (abs) 0.07 0.00 - 0.11 K/uL    NRBC (Absolute) 0.00 K/uL   COMP METABOLIC PANEL   Result Value Ref Range    Sodium 130 (L) 135 - 145 mmol/L    Potassium 4.9 3.6 - 5.5 mmol/L    Chloride 96 96 - 112 mmol/L    Co2 14 (L) 20 - 33 mmol/L    Anion Gap 20.0 (H) 7.0 - 16.0    Glucose 116 (H) 65 - 99 mg/dL    Bun 29 (H) 8 - 22 mg/dL    Creatinine 1.74 (H) 0.50 - 1.40 mg/dL    Calcium 10.5 8.5 - 10.5 mg/dL    AST(SGOT) 20 12 - 45 U/L    ALT(SGPT) 17 2 - 50 U/L    Alkaline Phosphatase 101 (H) 30 - 99 U/L    Total Bilirubin 1.0 0.1 - 1.5 mg/dL    Albumin 5.1 (H) 3.2 - 4.9 g/dL    Total Protein 8.2 6.0 - 8.2 g/dL    Globulin 3.1 1.9 - 3.5 g/dL    A-G Ratio 1.6 g/dL   LIPASE   Result Value Ref Range    Lipase 82 11 - 82 U/L   URINALYSIS    Specimen: Blood   Result Value Ref Range    Color Yellow     Character Clear     Specific Gravity >=1.045 (A) <1.035    Ph 5.5 5.0 - 8.0    Glucose Negative Negative mg/dL    Ketones Negative Negative mg/dL    Protein Negative Negative mg/dL    Bilirubin Negative Negative    Urobilinogen, Urine 0.2 Negative    Nitrite  Negative Negative    Leukocyte Esterase Negative Negative    Occult Blood Small (A) Negative    Micro Urine Req Microscopic    ESTIMATED GFR   Result Value Ref Range    GFR If  49 (A) >60 mL/min/1.73 m 2    GFR If Non African American 40 (A) >60 mL/min/1.73 m 2   Lactic Acid   Result Value Ref Range    Lactic Acid 3.0 (H) 0.5 - 2.0 mmol/L   Lactic Acid   Result Value Ref Range    Lactic Acid 8.1 (HH) 0.5 - 2.0 mmol/L   Lactic Acid   Result Value Ref Range    Lactic Acid 1.7 0.5 - 2.0 mmol/L   Prothrombin time (INR)   Result Value Ref Range    PT 15.2 (H) 12.0 - 14.6 sec    INR 1.23 (H) 0.87 - 1.13   URINE MICROSCOPIC (W/UA)   Result Value Ref Range    WBC 0-2 (A) /hpf    RBC 0-2 (A) /hpf    Bacteria Negative None /hpf    Epithelial Cells Negative /hpf    Hyaline Cast 0-2 /lpf      All labs reviewed by me.    RADIOLOGY  CT-ABDOMEN-PELVIS WITH   Final Result      1.  Mildly dilated fluid-filled jejunal loops in the left upper quadrant may represent enteritis. No surrounding inflammation, pneumoperitoneum, or abscess is identified.      2.  Atherosclerosis      DX-CHEST-PORTABLE (1 VIEW)   Final Result      No evidence of acute cardiopulmonary process.        The radiologist's interpretation of all radiological studies have been reviewed by me.    COURSE & MEDICAL DECISION MAKING  Nursing notes, VS, PMSFHx reviewed in chart.    7:51 PM Patient seen and examined at bedside. Patient will be treated with Flagyl IVPB 500 mg, Rocephin 2 g, morphine 4 mg/mL 4 mg, Zofran 5 mg, LR Bolus. Intravenous fluids administered for dehydration. Ordered CT-Abdomen-Pelvis With, DX-Chest, UA, Lipase, CMP, CBC w/diff, Estimated GFR, Lactic Acid, Blood Culture to evaluate his symptoms. The differential diagnoses include but are not limited to: diverticulitis vs dehydration vs electrolyte disturbance vs appendicitis.      9:15 PM - Paged Hospitalist.    9:20 PM - Patient was reevaluated at bedside. Informed patient of plan  for admission.    9:30 PM - I discussed the patient's case and the above findings with Dr. Bell (hospitalist) who agrees to admit the patient.      HYDRATION: Based on the patient's presentation of Dehydration the patient was given IV fluids. IV Hydration was used because oral hydration was not adequate alone. Upon recheck following hydration, the patient was improved.    CRITICAL CARE  The very real possibilty of a deterioration of this patient's condition required the highest level of my preparedness for sudden, emergent intervention.  I provided critical care services, which included medication orders, frequent reevaluations of the patient's condition and response to treatment, ordering and reviewing test results, and discussing the case with various consultants.  The critical care time associated with the care of the patient was 35 minutes. Review chart for interventions. This time is exclusive of any other billable procedures.      DISPOSITION:  Patient will be hospitalized by Dr. Bell in guarded condition.     FINAL IMPRESSION  1. Non-intractable vomiting with nausea, unspecified vomiting type    2. Dehydration    3. Hyponatremia    4. JAG (acute kidney injury) ([facility])          Vitaly STILL (Scribe), am scribing for, and in the presence of, Judith Glez M.D..    Electronically signed by: Vitaly Liz (Eva), 3/9/2022    Judith STILL M.D. personally performed the services described in this documentation, as scribed by Vitaly Liz in my presence, and it is both accurate and complete.    The note accurately reflects work and decisions made by me.  Judith Glez M.D.  3/9/2022  11:37 PM

## 2022-03-10 NOTE — ASSESSMENT & PLAN NOTE
Admits to smoking 0.5 to 1ppd  Cessation counseled 5mins, education provided, declined offered nicotine patch/gum

## 2022-03-11 ENCOUNTER — PATIENT OUTREACH (OUTPATIENT)
Dept: HEALTH INFORMATION MANAGEMENT | Facility: OTHER | Age: 62
End: 2022-03-11
Payer: COMMERCIAL

## 2022-03-11 ENCOUNTER — PHARMACY VISIT (OUTPATIENT)
Dept: PHARMACY | Facility: MEDICAL CENTER | Age: 62
End: 2022-03-11
Payer: COMMERCIAL

## 2022-03-11 VITALS
WEIGHT: 139.55 LBS | OXYGEN SATURATION: 96 % | DIASTOLIC BLOOD PRESSURE: 68 MMHG | BODY MASS INDEX: 20.67 KG/M2 | SYSTOLIC BLOOD PRESSURE: 117 MMHG | TEMPERATURE: 97.5 F | HEIGHT: 69 IN | RESPIRATION RATE: 16 BRPM | HEART RATE: 76 BPM

## 2022-03-11 LAB
ANION GAP SERPL CALC-SCNC: 12 MMOL/L (ref 7–16)
BUN SERPL-MCNC: 18 MG/DL (ref 8–22)
CALCIUM SERPL-MCNC: 8.8 MG/DL (ref 8.5–10.5)
CHLORIDE SERPL-SCNC: 102 MMOL/L (ref 96–112)
CO2 SERPL-SCNC: 22 MMOL/L (ref 20–33)
CREAT SERPL-MCNC: 1.08 MG/DL (ref 0.5–1.4)
ERYTHROCYTE [DISTWIDTH] IN BLOOD BY AUTOMATED COUNT: 47 FL (ref 35.9–50)
GLUCOSE SERPL-MCNC: 91 MG/DL (ref 65–99)
HCT VFR BLD AUTO: 42.8 % (ref 42–52)
HGB BLD-MCNC: 14.4 G/DL (ref 14–18)
MCH RBC QN AUTO: 29.9 PG (ref 27–33)
MCHC RBC AUTO-ENTMCNC: 33.6 G/DL (ref 33.7–35.3)
MCV RBC AUTO: 89 FL (ref 81.4–97.8)
PLATELET # BLD AUTO: 351 K/UL (ref 164–446)
PMV BLD AUTO: 9.9 FL (ref 9–12.9)
POTASSIUM SERPL-SCNC: 3.8 MMOL/L (ref 3.6–5.5)
RBC # BLD AUTO: 4.81 M/UL (ref 4.7–6.1)
SODIUM SERPL-SCNC: 136 MMOL/L (ref 135–145)
WBC # BLD AUTO: 11.9 K/UL (ref 4.8–10.8)

## 2022-03-11 PROCEDURE — A9270 NON-COVERED ITEM OR SERVICE: HCPCS | Performed by: STUDENT IN AN ORGANIZED HEALTH CARE EDUCATION/TRAINING PROGRAM

## 2022-03-11 PROCEDURE — 96372 THER/PROPH/DIAG INJ SC/IM: CPT

## 2022-03-11 PROCEDURE — 36415 COLL VENOUS BLD VENIPUNCTURE: CPT

## 2022-03-11 PROCEDURE — 700102 HCHG RX REV CODE 250 W/ 637 OVERRIDE(OP): Performed by: STUDENT IN AN ORGANIZED HEALTH CARE EDUCATION/TRAINING PROGRAM

## 2022-03-11 PROCEDURE — 80048 BASIC METABOLIC PNL TOTAL CA: CPT

## 2022-03-11 PROCEDURE — 99217 PR OBSERVATION CARE DISCHARGE: CPT | Performed by: GENERAL PRACTICE

## 2022-03-11 PROCEDURE — RXMED WILLOW AMBULATORY MEDICATION CHARGE: Performed by: GENERAL PRACTICE

## 2022-03-11 PROCEDURE — 700101 HCHG RX REV CODE 250: Performed by: STUDENT IN AN ORGANIZED HEALTH CARE EDUCATION/TRAINING PROGRAM

## 2022-03-11 PROCEDURE — 96376 TX/PRO/DX INJ SAME DRUG ADON: CPT

## 2022-03-11 PROCEDURE — G0378 HOSPITAL OBSERVATION PER HR: HCPCS

## 2022-03-11 PROCEDURE — 700111 HCHG RX REV CODE 636 W/ 250 OVERRIDE (IP): Performed by: STUDENT IN AN ORGANIZED HEALTH CARE EDUCATION/TRAINING PROGRAM

## 2022-03-11 PROCEDURE — 85027 COMPLETE CBC AUTOMATED: CPT

## 2022-03-11 RX ORDER — METRONIDAZOLE 500 MG/1
500 TABLET ORAL EVERY 8 HOURS
Qty: 15 TABLET | Refills: 0 | Status: SHIPPED | OUTPATIENT
Start: 2022-03-11 | End: 2022-03-16

## 2022-03-11 RX ORDER — CIPROFLOXACIN 500 MG/1
500 TABLET, FILM COATED ORAL 2 TIMES DAILY
Qty: 10 TABLET | Refills: 0 | Status: SHIPPED | OUTPATIENT
Start: 2022-03-11 | End: 2022-03-16

## 2022-03-11 RX ADMIN — METRONIDAZOLE 500 MG: 500 TABLET ORAL at 05:39

## 2022-03-11 RX ADMIN — HEPARIN SODIUM 5000 UNITS: 5000 INJECTION, SOLUTION INTRAVENOUS; SUBCUTANEOUS at 05:39

## 2022-03-11 RX ADMIN — CEFTRIAXONE SODIUM 1 G: 10 INJECTION, POWDER, FOR SOLUTION INTRAVENOUS at 05:39

## 2022-03-11 ASSESSMENT — COGNITIVE AND FUNCTIONAL STATUS - GENERAL
SUGGESTED CMS G CODE MODIFIER DAILY ACTIVITY: CH
MOBILITY SCORE: 24
DAILY ACTIVITIY SCORE: 24
SUGGESTED CMS G CODE MODIFIER MOBILITY: CH

## 2022-03-11 ASSESSMENT — ENCOUNTER SYMPTOMS
NAUSEA: 1
CHILLS: 1
VOMITING: 1

## 2022-03-11 ASSESSMENT — PAIN DESCRIPTION - PAIN TYPE: TYPE: ACUTE PAIN

## 2022-03-11 NOTE — DISCHARGE INSTRUCTIONS
Viral Gastroenteritis, Adult    Viral gastroenteritis is also known as the stomach flu. This condition may affect your stomach, your small intestine, and your large intestine. It can cause sudden watery poop (diarrhea), fever, and throwing up (vomiting). This condition is caused by certain germs (viruses). These germs can be passed from person to person very easily (are contagious).  Having watery poop and throwing up can make you feel weak and cause you to not have enough water in your body (get dehydrated). This can make you tired and thirsty, make you have a dry mouth, and make it so you pee (urinate) less often. It is important to replace the fluids that you lose from having watery poop and throwing up.  What are the causes?  · You can get sick by catching viruses from other people.  · You can also get sick by:  ? Eating food, drinking water, or touching a surface that has the viruses on it (is contaminated).  ? Sharing utensils or other personal items with a person who is sick.  What increases the risk?  · Having a weak body defense system (immune system).  · Living with one or more children who are younger than 2 years old.  · Living in a nursing home.  · Going on cruise ships.  What are the signs or symptoms?  Symptoms of this condition start suddenly. Symptoms may last for a few days or for as long as a week.  · Common symptoms include:  ? Watery poop.  ? Throwing up.  · Other symptoms include:  ? Fever.  ? Headache.  ? Feeling tired (fatigue).  ? Pain in the belly (abdomen).  ? Chills.  ? Feeling weak.  ? Feeling sick to your stomach (nauseous).  ? Muscle aches.  ? Not feeling hungry.  How is this treated?  · This condition typically goes away on its own.  · The focus of treatment is to replace the fluids that you lose. This condition may be treated with:  ? An ORS (oral rehydration solution). This is a drink that is sold at pharmacies and stores.  ? Medicines to help with your symptoms.  ? Probiotic  supplements to reduce symptoms of diarrhea.  ? Fluids given through an IV tube, if needed.  · Older adults and people with other diseases or a weak body defense system are at higher risk for not having enough water in the body.  Follow these instructions at home:  Eating and drinking    · Take an ORS as told by your doctor.  · Drink clear fluids in small amounts as you are able. Clear fluids include:  ? Water.  ? Ice chips.  ? Fruit juice with water added to it (diluted).  ? Low-calorie sports drinks.  · Drink enough fluid to keep your pee (urine) pale yellow.  · Eat small amounts of healthy foods every 3-4 hours as you are able. This may include whole grains, fruits, vegetables, lean meats, and yogurt.  · Avoid fluids that have a lot of sugar or caffeine in them, such as energy drinks, sports drinks, and soda.  · Avoid spicy or fatty foods.  · Avoid alcohol.  General instructions    · Wash your hands often. This is very important after you have watery poop or you throw up. If you cannot use soap and water, use hand .  · Make sure that all people in your home wash their hands well and often.  · Take over-the-counter and prescription medicines only as told by your doctor.  · Rest at home while you get better.  · Watch your condition for any changes.  · Take a warm bath to help with any burning or pain from having watery poop.  · Keep all follow-up visits as told by your doctor. This is important.  Contact a doctor if:  · You cannot keep fluids down.  · Your symptoms get worse.  · You have new symptoms.  · You feel light-headed.  · You feel dizzy.  · You have muscle cramps.  Get help right away if:  · You have chest pain.  · You feel very weak.  · You pass out (faint).  · You see blood in your throw-up.  · Your throw-up looks like coffee grounds.  · You have bloody or black poop (stools) or poop that looks like tar.  · You have a very bad headache, or a stiff neck, or both.  · You have a rash.  · You have  very bad pain, cramping, or bloating in your belly.  · You have trouble breathing.  · You are breathing very quickly.  · You have a fast heartbeat.  · Your skin feels cold and clammy.  · You feel mixed up (confused).  · You have pain when you pee.  · You have signs of not having enough water in the body, such as:  ? Dark pee, hardly any pee, or no pee.  ? Cracked lips.  ? Dry mouth.  ? Sunken eyes.  ? Feeling very sleepy.  ? Feeling weak.  Summary  · Viral gastroenteritis is also known as the stomach flu.  · This condition can cause sudden watery poop (diarrhea), fever, and throwing up (vomiting).  · These germs can be passed from person to person very easily.  · Take an ORS as told by your doctor. This is a drink that is sold at pharmacies and stores.  · Drink fluids in small amounts many times each day as you are able.  This information is not intended to replace advice given to you by your health care provider. Make sure you discuss any questions you have with your health care provider.  Document Released: 06/05/2009 Document Revised: 10/23/2019 Document Reviewed: 10/23/2019  [employer] Patient Education © 2020 [employer]  Discharge Instructions    Discharged to home by car with relative. Discharged via walking, hospital escort: Yes.  Special equipment needed: Not Applicable    Be sure to schedule a follow-up appointment with your primary care doctor or any specialists as instructed.     Discharge Plan:   Diet Plan: Discussed  Activity Level: Discussed  Confirmed Follow up Appointment: Patient to Call and Schedule Appointment  Confirmed Symptoms Management: Discussed  Medication Reconciliation Updated: Yes  Influenza Vaccine Indication: Patient Refuses    I understand that a diet low in cholesterol, fat, and sodium is recommended for good health. Unless I have been given specific instructions below for another diet, I accept this instruction as my diet prescription.   Other diet: regular    Special Instructions:  None    · Is patient discharged on Warfarin / Coumadin?   No     Depression / Suicide Risk    As you are discharged from this [facility] facility, it is important to learn how to keep safe from harming yourself.    Recognize the warning signs:  · Abrupt changes in personality, positive or negative- including increase in energy   · Giving away possessions  · Change in eating patterns- significant weight changes-  positive or negative  · Change in sleeping patterns- unable to sleep or sleeping all the time   · Unwillingness or inability to communicate  · Depression  · Unusual sadness, discouragement and loneliness  · Talk of wanting to die  · Neglect of personal appearance   · Rebelliousness- reckless behavior  · Withdrawal from people/activities they love  · Confusion- inability to concentrate     If you or a loved one observes any of these behaviors or has concerns about self-harm, here's what you can do:  · Talk about it- your feelings and reasons for harming yourself  · Remove any means that you might use to hurt yourself (examples: pills, rope, extension cords, firearm)  · Get professional help from the community (Mental Health, Substance Abuse, psychological counseling)  · Do not be alone:Call your Safe Contact- someone whom you trust who will be there for you.  · Call your local CRISIS HOTLINE 197-8892 or 164-678-4920  · Call your local Children's Mobile Crisis Response Team [place] (436) 064-4474 or www.Kanbox  · Call the toll free National Suicide Prevention Hotlines   · National Suicide Prevention Lifeline 364-498-ZPQW (0139)  · National Hope Line Network 800-SUICIDE (127-5934)    Please take the antibiotics as instructed flagyl 500mg three times a day with food and ciprofloxacin 500mg twice a day after food.   Please stop smoking marijuana, tobacco and any alcohol.   Advance your diet as you can tolerate.    Please follow up with a primary care doctor.   Please take care and be well.

## 2022-03-11 NOTE — CARE PLAN
The patient is Stable - Low risk of patient condition declining or worsening    Shift Goals  Clinical Goals: To prevent falls and/or injury  Patient Goals: To advance diet and go home.    Progress made toward(s) clinical / shift goals:  No falls and/or injuries this shift.    Patient is not progressing towards the following goals:

## 2022-03-11 NOTE — DISCHARGE SUMMARY
Discharge Summary    CHIEF COMPLAINT ON ADMISSION  Chief Complaint   Patient presents with   • Vomiting     For the last couple of days, haven't been able to keep anything done.    • Chills     Started Monday       Reason for Admission  Vomiting     Admission Date  3/9/2022    CODE STATUS  Full Code    HPI & HOSPITAL COURSE  This is a 61 year old male with Pmhx of induced hyperemesis disorder, tobacco use disorder, COVID vaccinated x3 and with recent Covid infection 2 weeks prior to admission who was admitted on 03/09/2022 with abdominal pain, nausea and vomiting.    Patient admitted with sepsis with JAG and severe lactic acidosis likely secondary to gastroenteritis.  CT imaging of the abdomen and pelvis noted enteritis. Blood cultures NGTD. UA and CXR with no evidence of infection. Started on Rocephin and Flagyl.  Patient tolerated clear liquid diet, this was advanced to full liquid and then eventually GI soft, with no further abdominal pain nausea or vomiting.    Patient discharged with meds to beds to complete course of antibiotics.  Patient encouraged to discontinue alcohol, tobacco and marijuana use.  Patient is to follow-up with his primary care physician outpatient.    Therefore, he is discharged in good and stable condition to home with close outpatient follow-up.    The patient met 2-midnight criteria for an inpatient stay at the time of discharge.    Discharge Date  03/11/2022    FOLLOW UP ITEMS POST DISCHARGE  Primary care physician    DISCHARGE DIAGNOSES  Principal Problem:    Sepsis (HCC) POA: Unknown  Active Problems:    JAG (acute kidney injury) (HCC) POA: Unknown    Leukocytosis POA: Yes      Overview: Continue rocephin / flagyl       AM CBC    Tobacco use POA: Yes    Gastroenteritis POA: Yes    Intractable nausea and vomiting POA: Unknown    Marijuana abuse POA: Unknown    Lactic acidosis POA: Unknown    Dehydration POA: Unknown    Hyponatremia POA: Unknown  Resolved Problems:    * No resolved  hospital problems. *      FOLLOW UP  [facility] ([facility]) - Primary Care  [address] [place] [id number]  [phone]  Call  Please call [facility] to establish with a Primary Care Provider. This office offers sliding fee scales based on income. Thank you.      MEDICATIONS ON DISCHARGE     Medication List      START taking these medications      Instructions   ciprofloxacin 500 MG Tabs  Commonly known as: CIPRO   Take 1 Tablet by mouth 2 times a day for 5 days.  Dose: 500 mg     metroNIDAZOLE 500 MG Tabs  Commonly known as: FLAGYL   Take 1 Tablet by mouth every 8 hours for 5 days.  Dose: 500 mg            Allergies  No Known Allergies    DIET  Orders Placed This Encounter   Procedures   • Diet Order Diet: Low Fiber(GI Soft)     Standing Status:   Standing     Number of Occurrences:   1     Order Specific Question:   Diet:     Answer:   Low Fiber(GI Soft) [2]       ACTIVITY  As tolerated.  Weight bearing as tolerated    CONSULTATIONS  None    PROCEDURES  None    LABORATORY  Lab Results   Component Value Date    SODIUM 136 03/11/2022    POTASSIUM 3.8 03/11/2022    CHLORIDE 102 03/11/2022    CO2 22 03/11/2022    GLUCOSE 91 03/11/2022    BUN 18 03/11/2022    CREATININE 1.08 03/11/2022        Lab Results   Component Value Date    WBC 11.9 (H) 03/11/2022    HEMOGLOBIN 14.4 03/11/2022    HEMATOCRIT 42.8 03/11/2022    PLATELETCT 351 03/11/2022      CT-ABDOMEN-PELVIS WITH   Final Result      1.  Mildly dilated fluid-filled jejunal loops in the left upper quadrant may represent enteritis. No surrounding inflammation, pneumoperitoneum, or abscess is identified.      2.  Atherosclerosis      DX-CHEST-PORTABLE (1 VIEW)   Final Result      No evidence of acute cardiopulmonary process.        Total time of the discharge process exceeds 45 minutes.

## 2022-03-11 NOTE — PROGRESS NOTES
@ 3:17 am - Second bag of Lactated Ringers completed and IV saline locked. Pt without any signs/symptoms of distress.    Pt verbalized wishes for an advance in diet for breakfast. He tolerated lix diet without any nausea or vomiting. Will advance diet in chart.    Pt left with HOB elevated @ 45 degrees; bed rails up x2, call light within reach, will continue to monitor intermittently.    SUYAPA Ricks RN

## 2022-03-11 NOTE — HOSPITAL COURSE
This is a 61 year old male with Pmhx of induced hyperemesis disorder, tobacco use disorder, COVID vaccinated x3 and with recent Covid infection 2 weeks prior to admission who was admitted on 03/09/2022 with abdominal pain, nausea and vomiting.    Patient admitted with sepsis with JAG and severe lactic acidosis likely secondary to gastroenteritis.  CT imaging of the abdomen and pelvis noted enteritis. Blood cultures NGTD. UA and CXR with no evidence of infection. Started on Rocephin and Flagyl.  Patient tolerated clear liquid diet, this was advanced to full liquid and then eventually GI soft, with no further abdominal pain nausea or vomiting.    Patient discharged with meds to beds to complete course of antibiotics.  Patient encouraged to discontinue alcohol, tobacco and marijuana use.  Patient is to follow-up with his primary care physician outpatient.

## 2022-03-11 NOTE — PROGRESS NOTES
Pt arrived to unit from ER, axox4, vss on RA, pt tolerating clear liquid diet and was advanced to full liquid, pt continues to tolerate diet, pt complains of very minimal abdominal pain rated 2/10, denies any need for pain medications, pt steady on feet, scoring no risk, up independently in room, will provide hourly rounding   Wound Care: Petrolatum

## 2022-03-11 NOTE — PROGRESS NOTES
This RN has assumed care of pt. VSS. Pt denies pain, tolerating full liquids and has been advanced to GI soft, pt has DC orders, awaiting for meds to bed.

## 2022-03-11 NOTE — CARE PLAN
The patient is Stable - Low risk of patient condition declining or worsening    Shift Goals  Clinical Goals: tolerate advancing diet  Patient Goals: advance diet and discharge    Progress made toward(s) clinical / shift goals:  Pt has tolerated a GI soft diet and will be discharging this afternoon    Patient is not progressing towards the following goals:

## 2022-03-11 NOTE — PROGRESS NOTES
Pt has been discharged to home, dc paperwork discussed, meds to bed delivered, pt has left with all belongings, verbalized understanding regarding dc instructions and has been escorted to his ride at ER entrance

## 2022-03-12 NOTE — DISCHARGE PLANNING
Meds-to-Beds: Discharge prescription orders listed below delivered to patient's bedside. JUAN DAVID Ruiz notified. Patient counseled. Patient elected to have co-payment billed to patient account.      Current Outpatient Medications   Medication Sig Dispense Refill   • metroNIDAZOLE (FLAGYL) 500 MG Tab Take 1 Tablet by mouth every 8 hours for 5 days. 15 Tablet 0   • ciprofloxacin (CIPRO) 500 MG Tab Take 1 Tablet by mouth 2 times a day for 5 days. 10 Tablet 0      Usha Botello, PharmD

## 2022-03-14 LAB
BACTERIA BLD CULT: NORMAL
BACTERIA BLD CULT: NORMAL
SIGNIFICANT IND 70042: NORMAL
SIGNIFICANT IND 70042: NORMAL
SITE SITE: NORMAL
SITE SITE: NORMAL
SOURCE SOURCE: NORMAL
SOURCE SOURCE: NORMAL

## 2022-11-29 ENCOUNTER — HOSPITAL ENCOUNTER (INPATIENT)
Facility: MEDICAL CENTER | Age: 62
LOS: 2 days | DRG: 392 | End: 2022-12-02
Attending: EMERGENCY MEDICINE | Admitting: STUDENT IN AN ORGANIZED HEALTH CARE EDUCATION/TRAINING PROGRAM
Payer: COMMERCIAL

## 2022-11-29 ENCOUNTER — APPOINTMENT (OUTPATIENT)
Dept: RADIOLOGY | Facility: MEDICAL CENTER | Age: 62
DRG: 392 | End: 2022-11-29
Attending: EMERGENCY MEDICINE
Payer: COMMERCIAL

## 2022-11-29 DIAGNOSIS — R09.02 HYPOXIA: ICD-10-CM

## 2022-11-29 DIAGNOSIS — N17.9 AKI (ACUTE KIDNEY INJURY) (HCC): ICD-10-CM

## 2022-11-29 DIAGNOSIS — K52.9 ILEITIS: ICD-10-CM

## 2022-11-29 DIAGNOSIS — R11.2 NAUSEA AND VOMITING, UNSPECIFIED VOMITING TYPE: ICD-10-CM

## 2022-11-29 LAB
ALBUMIN SERPL BCP-MCNC: 5.1 G/DL (ref 3.2–4.9)
ALBUMIN/GLOB SERPL: 1.6 G/DL
ALP SERPL-CCNC: 116 U/L (ref 30–99)
ALT SERPL-CCNC: 70 U/L (ref 2–50)
ANION GAP SERPL CALC-SCNC: 21 MMOL/L (ref 7–16)
AST SERPL-CCNC: 34 U/L (ref 12–45)
BASOPHILS # BLD AUTO: 0.1 % (ref 0–1.8)
BASOPHILS # BLD: 0.02 K/UL (ref 0–0.12)
BILIRUB SERPL-MCNC: 0.9 MG/DL (ref 0.1–1.5)
BUN SERPL-MCNC: 26 MG/DL (ref 8–22)
CALCIUM SERPL-MCNC: 10.4 MG/DL (ref 8.5–10.5)
CHLORIDE SERPL-SCNC: 99 MMOL/L (ref 96–112)
CO2 SERPL-SCNC: 12 MMOL/L (ref 20–33)
CREAT SERPL-MCNC: 1.59 MG/DL (ref 0.5–1.4)
EOSINOPHIL # BLD AUTO: 0 K/UL (ref 0–0.51)
EOSINOPHIL NFR BLD: 0 % (ref 0–6.9)
ERYTHROCYTE [DISTWIDTH] IN BLOOD BY AUTOMATED COUNT: 43 FL (ref 35.9–50)
FLUAV RNA SPEC QL NAA+PROBE: NEGATIVE
FLUBV RNA SPEC QL NAA+PROBE: NEGATIVE
GFR SERPLBLD CREATININE-BSD FMLA CKD-EPI: 49 ML/MIN/1.73 M 2
GLOBULIN SER CALC-MCNC: 3.2 G/DL (ref 1.9–3.5)
GLUCOSE SERPL-MCNC: 159 MG/DL (ref 65–99)
HCT VFR BLD AUTO: 51.4 % (ref 42–52)
HGB BLD-MCNC: 18 G/DL (ref 14–18)
IMM GRANULOCYTES # BLD AUTO: 0.1 K/UL (ref 0–0.11)
IMM GRANULOCYTES NFR BLD AUTO: 0.7 % (ref 0–0.9)
LIPASE SERPL-CCNC: 19 U/L (ref 11–82)
LYMPHOCYTES # BLD AUTO: 2.79 K/UL (ref 1–4.8)
LYMPHOCYTES NFR BLD: 19.2 % (ref 22–41)
MCH RBC QN AUTO: 29.9 PG (ref 27–33)
MCHC RBC AUTO-ENTMCNC: 35 G/DL (ref 33.7–35.3)
MCV RBC AUTO: 85.4 FL (ref 81.4–97.8)
MONOCYTES # BLD AUTO: 0.94 K/UL (ref 0–0.85)
MONOCYTES NFR BLD AUTO: 6.5 % (ref 0–13.4)
NEUTROPHILS # BLD AUTO: 10.65 K/UL (ref 1.82–7.42)
NEUTROPHILS NFR BLD: 73.5 % (ref 44–72)
NRBC # BLD AUTO: 0 K/UL
NRBC BLD-RTO: 0 /100 WBC
PLATELET # BLD AUTO: 411 K/UL (ref 164–446)
PMV BLD AUTO: 10.1 FL (ref 9–12.9)
POTASSIUM SERPL-SCNC: 4 MMOL/L (ref 3.6–5.5)
PROT SERPL-MCNC: 8.3 G/DL (ref 6–8.2)
RBC # BLD AUTO: 6.02 M/UL (ref 4.7–6.1)
RSV RNA SPEC QL NAA+PROBE: NEGATIVE
SARS-COV-2 RNA RESP QL NAA+PROBE: NOTDETECTED
SODIUM SERPL-SCNC: 132 MMOL/L (ref 135–145)
SPECIMEN SOURCE: NORMAL
WBC # BLD AUTO: 14.5 K/UL (ref 4.8–10.8)

## 2022-11-29 PROCEDURE — 96375 TX/PRO/DX INJ NEW DRUG ADDON: CPT

## 2022-11-29 PROCEDURE — 80053 COMPREHEN METABOLIC PANEL: CPT

## 2022-11-29 PROCEDURE — 700105 HCHG RX REV CODE 258: Performed by: EMERGENCY MEDICINE

## 2022-11-29 PROCEDURE — 96365 THER/PROPH/DIAG IV INF INIT: CPT

## 2022-11-29 PROCEDURE — 700102 HCHG RX REV CODE 250 W/ 637 OVERRIDE(OP): Performed by: EMERGENCY MEDICINE

## 2022-11-29 PROCEDURE — 85025 COMPLETE CBC W/AUTO DIFF WBC: CPT

## 2022-11-29 PROCEDURE — C9803 HOPD COVID-19 SPEC COLLECT: HCPCS | Performed by: EMERGENCY MEDICINE

## 2022-11-29 PROCEDURE — 99285 EMERGENCY DEPT VISIT HI MDM: CPT

## 2022-11-29 PROCEDURE — 83690 ASSAY OF LIPASE: CPT

## 2022-11-29 PROCEDURE — A9270 NON-COVERED ITEM OR SERVICE: HCPCS | Performed by: EMERGENCY MEDICINE

## 2022-11-29 PROCEDURE — 36415 COLL VENOUS BLD VENIPUNCTURE: CPT

## 2022-11-29 PROCEDURE — 81001 URINALYSIS AUTO W/SCOPE: CPT

## 2022-11-29 PROCEDURE — 0241U HCHG SARS-COV-2 COVID-19 NFCT DS RESP RNA 4 TRGT MIC: CPT

## 2022-11-29 PROCEDURE — 700111 HCHG RX REV CODE 636 W/ 250 OVERRIDE (IP): Performed by: EMERGENCY MEDICINE

## 2022-11-29 PROCEDURE — 84145 PROCALCITONIN (PCT): CPT

## 2022-11-29 RX ORDER — ACETAMINOPHEN 325 MG/1
650 TABLET ORAL ONCE
Status: ACTIVE | OUTPATIENT
Start: 2022-11-29 | End: 2022-11-30

## 2022-11-29 RX ORDER — MORPHINE SULFATE 4 MG/ML
4 INJECTION INTRAVENOUS ONCE
Status: COMPLETED | OUTPATIENT
Start: 2022-11-29 | End: 2022-11-29

## 2022-11-29 RX ORDER — ONDANSETRON 2 MG/ML
4 INJECTION INTRAMUSCULAR; INTRAVENOUS ONCE
Status: COMPLETED | OUTPATIENT
Start: 2022-11-29 | End: 2022-11-29

## 2022-11-29 RX ORDER — SODIUM CHLORIDE 9 MG/ML
1000 INJECTION, SOLUTION INTRAVENOUS ONCE
Status: COMPLETED | OUTPATIENT
Start: 2022-11-29 | End: 2022-11-29

## 2022-11-29 RX ADMIN — MORPHINE SULFATE 4 MG: 4 INJECTION, SOLUTION INTRAMUSCULAR; INTRAVENOUS at 21:22

## 2022-11-29 RX ADMIN — ONDANSETRON 4 MG: 2 INJECTION INTRAMUSCULAR; INTRAVENOUS at 21:22

## 2022-11-29 RX ADMIN — SODIUM CHLORIDE 1000 ML: 9 INJECTION, SOLUTION INTRAVENOUS at 21:22

## 2022-11-29 ASSESSMENT — FIBROSIS 4 INDEX: FIB4 SCORE: 0.73

## 2022-11-30 ENCOUNTER — APPOINTMENT (OUTPATIENT)
Dept: RADIOLOGY | Facility: MEDICAL CENTER | Age: 62
DRG: 392 | End: 2022-11-30
Attending: EMERGENCY MEDICINE
Payer: COMMERCIAL

## 2022-11-30 PROBLEM — R09.02 HYPOXIA: Status: ACTIVE | Noted: 2022-11-30

## 2022-11-30 LAB
APPEARANCE UR: CLEAR
BACTERIA #/AREA URNS HPF: ABNORMAL /HPF
BILIRUB UR QL STRIP.AUTO: NEGATIVE
COLOR UR: YELLOW
EKG IMPRESSION: NORMAL
EKG IMPRESSION: NORMAL
EPI CELLS #/AREA URNS HPF: NEGATIVE /HPF
GLUCOSE UR STRIP.AUTO-MCNC: NEGATIVE MG/DL
HYALINE CASTS #/AREA URNS LPF: ABNORMAL /LPF
KETONES UR STRIP.AUTO-MCNC: NEGATIVE MG/DL
LEUKOCYTE ESTERASE UR QL STRIP.AUTO: NEGATIVE
MICRO URNS: ABNORMAL
NITRITE UR QL STRIP.AUTO: NEGATIVE
PH UR STRIP.AUTO: 5.5 [PH] (ref 5–8)
PROCALCITONIN SERPL-MCNC: 0.13 NG/ML
PROT UR QL STRIP: 30 MG/DL
RBC # URNS HPF: ABNORMAL /HPF
RBC UR QL AUTO: ABNORMAL
SP GR UR STRIP.AUTO: 1.02
UROBILINOGEN UR STRIP.AUTO-MCNC: 0.2 MG/DL
WBC #/AREA URNS HPF: ABNORMAL /HPF

## 2022-11-30 PROCEDURE — A9270 NON-COVERED ITEM OR SERVICE: HCPCS | Performed by: STUDENT IN AN ORGANIZED HEALTH CARE EDUCATION/TRAINING PROGRAM

## 2022-11-30 PROCEDURE — 700105 HCHG RX REV CODE 258: Performed by: STUDENT IN AN ORGANIZED HEALTH CARE EDUCATION/TRAINING PROGRAM

## 2022-11-30 PROCEDURE — 74177 CT ABD & PELVIS W/CONTRAST: CPT

## 2022-11-30 PROCEDURE — 700117 HCHG RX CONTRAST REV CODE 255: Performed by: EMERGENCY MEDICINE

## 2022-11-30 PROCEDURE — A9270 NON-COVERED ITEM OR SERVICE: HCPCS | Performed by: INTERNAL MEDICINE

## 2022-11-30 PROCEDURE — C9803 HOPD COVID-19 SPEC COLLECT: HCPCS | Performed by: EMERGENCY MEDICINE

## 2022-11-30 PROCEDURE — 93005 ELECTROCARDIOGRAM TRACING: CPT | Performed by: STUDENT IN AN ORGANIZED HEALTH CARE EDUCATION/TRAINING PROGRAM

## 2022-11-30 PROCEDURE — 99223 1ST HOSP IP/OBS HIGH 75: CPT | Mod: AI | Performed by: STUDENT IN AN ORGANIZED HEALTH CARE EDUCATION/TRAINING PROGRAM

## 2022-11-30 PROCEDURE — 700111 HCHG RX REV CODE 636 W/ 250 OVERRIDE (IP): Performed by: INTERNAL MEDICINE

## 2022-11-30 PROCEDURE — 770001 HCHG ROOM/CARE - MED/SURG/GYN PRIV*

## 2022-11-30 PROCEDURE — 71045 X-RAY EXAM CHEST 1 VIEW: CPT

## 2022-11-30 PROCEDURE — 700105 HCHG RX REV CODE 258: Performed by: INTERNAL MEDICINE

## 2022-11-30 PROCEDURE — 700102 HCHG RX REV CODE 250 W/ 637 OVERRIDE(OP): Performed by: INTERNAL MEDICINE

## 2022-11-30 PROCEDURE — 700102 HCHG RX REV CODE 250 W/ 637 OVERRIDE(OP): Performed by: STUDENT IN AN ORGANIZED HEALTH CARE EDUCATION/TRAINING PROGRAM

## 2022-11-30 PROCEDURE — 93005 ELECTROCARDIOGRAM TRACING: CPT | Performed by: INTERNAL MEDICINE

## 2022-11-30 RX ORDER — DIPHENHYDRAMINE HCL 25 MG
25 TABLET ORAL EVERY 6 HOURS PRN
Status: DISCONTINUED | OUTPATIENT
Start: 2022-11-30 | End: 2022-12-02 | Stop reason: HOSPADM

## 2022-11-30 RX ORDER — SODIUM CHLORIDE 9 MG/ML
INJECTION, SOLUTION INTRAVENOUS CONTINUOUS
Status: DISCONTINUED | OUTPATIENT
Start: 2022-11-30 | End: 2022-12-02 | Stop reason: HOSPADM

## 2022-11-30 RX ORDER — PROMETHAZINE HYDROCHLORIDE 25 MG/1
12.5-25 TABLET ORAL EVERY 4 HOURS PRN
Status: DISCONTINUED | OUTPATIENT
Start: 2022-11-30 | End: 2022-12-02 | Stop reason: HOSPADM

## 2022-11-30 RX ORDER — ENOXAPARIN SODIUM 100 MG/ML
40 INJECTION SUBCUTANEOUS DAILY
Status: DISCONTINUED | OUTPATIENT
Start: 2022-11-30 | End: 2022-12-02 | Stop reason: HOSPADM

## 2022-11-30 RX ORDER — ACETAMINOPHEN 325 MG/1
650 TABLET ORAL EVERY 6 HOURS PRN
Status: DISCONTINUED | OUTPATIENT
Start: 2022-11-30 | End: 2022-12-02 | Stop reason: HOSPADM

## 2022-11-30 RX ORDER — METRONIDAZOLE 500 MG/100ML
500 INJECTION, SOLUTION INTRAVENOUS EVERY 8 HOURS
Status: DISCONTINUED | OUTPATIENT
Start: 2022-11-30 | End: 2022-11-30

## 2022-11-30 RX ORDER — ONDANSETRON 2 MG/ML
4 INJECTION INTRAMUSCULAR; INTRAVENOUS EVERY 4 HOURS PRN
Status: DISCONTINUED | OUTPATIENT
Start: 2022-11-30 | End: 2022-12-02 | Stop reason: HOSPADM

## 2022-11-30 RX ORDER — METRONIDAZOLE 500 MG/1
500 TABLET ORAL EVERY 8 HOURS
Status: DISCONTINUED | OUTPATIENT
Start: 2022-11-30 | End: 2022-12-01

## 2022-11-30 RX ORDER — PROMETHAZINE HYDROCHLORIDE 25 MG/1
12.5-25 SUPPOSITORY RECTAL EVERY 4 HOURS PRN
Status: DISCONTINUED | OUTPATIENT
Start: 2022-11-30 | End: 2022-12-02 | Stop reason: HOSPADM

## 2022-11-30 RX ORDER — PROCHLORPERAZINE EDISYLATE 5 MG/ML
5-10 INJECTION INTRAMUSCULAR; INTRAVENOUS EVERY 4 HOURS PRN
Status: DISCONTINUED | OUTPATIENT
Start: 2022-11-30 | End: 2022-12-02 | Stop reason: HOSPADM

## 2022-11-30 RX ORDER — SODIUM CHLORIDE, SODIUM LACTATE, POTASSIUM CHLORIDE, CALCIUM CHLORIDE 600; 310; 30; 20 MG/100ML; MG/100ML; MG/100ML; MG/100ML
INJECTION, SOLUTION INTRAVENOUS CONTINUOUS
Status: DISCONTINUED | OUTPATIENT
Start: 2022-11-30 | End: 2022-11-30

## 2022-11-30 RX ORDER — ZOLPIDEM TARTRATE 5 MG/1
5 TABLET ORAL NIGHTLY PRN
Status: DISCONTINUED | OUTPATIENT
Start: 2022-11-30 | End: 2022-12-02 | Stop reason: HOSPADM

## 2022-11-30 RX ORDER — ONDANSETRON 4 MG/1
4 TABLET, ORALLY DISINTEGRATING ORAL EVERY 4 HOURS PRN
Status: DISCONTINUED | OUTPATIENT
Start: 2022-11-30 | End: 2022-12-02 | Stop reason: HOSPADM

## 2022-11-30 RX ADMIN — DIPHENHYDRAMINE HYDROCHLORIDE 25 MG: 25 TABLET ORAL at 10:50

## 2022-11-30 RX ADMIN — SODIUM CHLORIDE: 9 INJECTION, SOLUTION INTRAVENOUS at 20:52

## 2022-11-30 RX ADMIN — IOHEXOL 100 ML: 350 INJECTION, SOLUTION INTRAVENOUS at 00:45

## 2022-11-30 RX ADMIN — PROMETHAZINE HYDROCHLORIDE 25 MG: 25 TABLET ORAL at 20:48

## 2022-11-30 RX ADMIN — METRONIDAZOLE 500 MG: 500 TABLET ORAL at 09:56

## 2022-11-30 RX ADMIN — SODIUM CHLORIDE, POTASSIUM CHLORIDE, SODIUM LACTATE AND CALCIUM CHLORIDE: 600; 310; 30; 20 INJECTION, SOLUTION INTRAVENOUS at 02:34

## 2022-11-30 RX ADMIN — METRONIDAZOLE 500 MG: 500 TABLET ORAL at 16:18

## 2022-11-30 RX ADMIN — CEFTRIAXONE SODIUM 2000 MG: 2 INJECTION, POWDER, FOR SOLUTION INTRAMUSCULAR; INTRAVENOUS at 09:56

## 2022-11-30 RX ADMIN — SODIUM CHLORIDE 1000 ML: 9 INJECTION, SOLUTION INTRAVENOUS at 10:00

## 2022-11-30 ASSESSMENT — ENCOUNTER SYMPTOMS
ABDOMINAL PAIN: 1
PSYCHIATRIC NEGATIVE: 1
EYES NEGATIVE: 1
VOMITING: 1
MUSCULOSKELETAL NEGATIVE: 1
CARDIOVASCULAR NEGATIVE: 1
NEUROLOGICAL NEGATIVE: 1
WEAKNESS: 1
NAUSEA: 1
DIARRHEA: 1
RESPIRATORY NEGATIVE: 1

## 2022-11-30 NOTE — ASSESSMENT & PLAN NOTE
Admit patient to medical floor  CAT scan abdomen showing inflammation of the terminal ileum. ?  Possible Crohn's disease.  Has never been told he has Crohn's disease in the past.  Consider GI consult upon discharge  Symptoms has been improving.  I discontinued antibiotics  White blood cell count is within normal limits.  Started him on full liquid diet advance diet as tolerated.

## 2022-11-30 NOTE — ED NOTES
"Pt back from imaging without test being performed d/t pt \"needing the bathroom\". CT tech says she will return when able. Urinal provided to pt.  "

## 2022-11-30 NOTE — ED NOTES
History of Present Illness





- History of Present Illness


History of Present Illness: 


Pt is a 28yo F with a PMH of asthma and multiple breast surgeries for fibroma 

removal.  Pt presented to the ER on 4-3-2017 with SOB, productive cough, nasal 

congestion, fever, and wheezing for three days duration.  At the time inhaler 

use was ineffective to resolve symptoms.





Hospital treatment has been effective with symptoms resolving until last night 

around migdnight when SOB, chest pain associated with non-productive coughing, 

and headache began.  Pt was seen and examined at the bedside breathing 

comfortably on room air and complained of SOB especially with exertion, non-

productive cough, cough associated chest pain, chest congestion, "itchiness of 

lungs inside my chest, I wish I could scratch my lungs.", and new onset R leg 

pain.  Pt describes a log recent plane flight and denies birth control.  Pt 

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  At home there is no mold and a 

laundromat is used for washing cloths.  Vaccines are up to date.





PMH: asthma


PSH: multiple breast surgeries for fibroma removal


Allergy: Nuts (anaphylaxis) with epi pen





Smoking: none; occasional Hooka pipe use in the past and now has quit because 

this smoking exacerbated asthma symptoms








Review of Systems





- Constitutional


Constitutional: absent: Chills, Fever, Headache





- EENT


Nose/Mouth/Throat: Hoarsness





- Cardiovascular


Cardiovascular: Chest Pain (with coughing)





- Respiratory


Respiratory: As Per HPI, Cough (dry), Chest Congestion, Pain with Coughing.  

absent: Excessive Mucous Production





- Gastrointestinal


Gastrointestinal: As Per HPI.  absent: Abdominal Pain, Change in Bowel Habits, 

Change in Stool Character, Constipation, Diarrhea, Loose Stools, Nausea, 

Vomiting





- Genitourinary


Genitourinary: absent: Change in Urinary Stream, Hematuria, Urinary Frequency





- Reproductive: Female


Reproductive:Female: Currently Menstual, Other (recent mis-carrage)





- Musculoskeletal


Additional comments: 


R Leg Pain new onset





Past Patient History





- Infectious Disease


Hx of Infectious Diseases: None





- Past Medical History & Family History


Past Medical History?: Yes





- Past Social History


Smoking Status: Never Smoked





- CARDIAC


Hx Cardiac Disorders: No





- PULMONARY


Hx Asthma: Yes





- NEUROLOGICAL


Hx Neurological Disorder: No





- HEENT


Hx HEENT Problems: No





- RENAL


Hx Chronic Kidney Disease: No





- ENDOCRINE/METABOLIC


Hx Endocrine Disorders: Yes


Other/Comment: "Hormone problem"





- HEMATOLOGICAL/ONCOLOGICAL


Hx Blood Disorders: No





- INTEGUMENTARY


Hx Dermatological Problems: No





- MUSCULOSKELETAL/RHEUMATOLOGICAL


Hx Musculoskeletal Disorders: Yes


Hx Falls: No





- GASTROINTESTINAL


Hx Gastrointestinal Disorders: No





- GENITOURINARY/GYNECOLOGICAL


Hx Genitourinary Disorders: No





- PSYCHIATRIC


Hx Anxiety: Yes


Hx Substance Use: No





- SURGICAL HISTORY


Hx Surgeries: Yes


Other/Comment: right/left lumpectomy x 6-last June 2,2015





- ANESTHESIA


Hx Anesthesia: No


Hx Anesthesia Reactions: No


Hx Malignant Hyperthermia: No





Meds


Allergies/Adverse Reactions: 


 Allergies











Allergy/AdvReac Type Severity Reaction Status Date / Time


 


walnut Allergy   Verified 04/03/17 11:28


 


yeast, dried Allergy   Verified 04/03/17 11:28


 


nuts Allergy  URTICARIA Uncoded 04/03/17 11:28














- Medications


Medications: 


 Current Medications





Acetaminophen (Tylenol 325mg Tab)  650 mg PO Q6 PRN


   PRN Reason: Pain, moderate (4-7)


   Last Admin: 04/05/17 11:27 Dose:  650 mg


Albuterol/Ipratropium (Duoneb 3 Mg/0.5 Mg (3 Ml) Ud)  3 ml INH RQ2 PRN


   PRN Reason: wheezing/sob


   Last Admin: 04/03/17 20:17 Dose:  3 ml


Albuterol/Ipratropium (Duoneb 3 Mg/0.5 Mg (3 Ml) Ud)  3 ml INH RQ6 HANDY


   Last Admin: 04/05/17 13:42 Dose:  3 ml


Ceftriaxone Sodium 1 gm/ (Sodium Chloride)  100 mls @ 100 mls/hr IVPB DAILY HANDY


   Last Admin: 04/05/17 09:24 Dose:  100 mls/hr


Methylprednisolone (Solu-Medrol)  60 mg IVP Q8 HANDY


   Last Admin: 04/05/17 13:26 Dose:  60 mg


Montelukast Sodium (Singulair)  10 mg PO HS HANDY


   Last Admin: 04/04/17 21:18 Dose:  10 mg


Pantoprazole Sodium (Protonix Ec Tab)  40 mg PO DAILY HANDY


   Last Admin: 04/05/17 09:24 Dose:  40 mg


Promethazine HCl/Dextromethorphan (Phenergan Dm Syrup)  5 ml PO Q6H PRN


   PRN Reason: Cough


   Last Admin: 04/05/17 11:28 Dose:  5 ml











Physical Exam





- Constitutional


Appears: Well, Non-toxic, No Acute Distress





- Head Exam


Head Exam: ATRAUMATIC, NORMOCEPHALIC





- Eye Exam


Eye Exam: EOMI, Normal appearance, PERRL





- ENT Exam


ENT Exam: Mucous Membranes Moist, Normal Exam





- Respiratory Exam


Respiratory Exam: Rhonchi (bilateral lower lobes), Wheezes (mainly R upper lobe)

, NORMAL BREATHING PATTERN.  absent: Clear to Auscultation Bilateral





- Cardiovascular Exam


Cardiovascular Exam: +S1, +S2.  absent: JVD, Systolic Murmur





- Extremities Exam


Extremities exam: Positive for: tenderness (R leg; calf).  Negative for: normal 

inspection (R leg pain; with calf palpation)





- Neurological Exam


Neurological exam: Alert, Oriented x3





- Psychiatric Exam


Psychiatric exam: Normal Affect, Normal Mood





- Skin


Skin Exam: Dry, Intact, Normal Color, Warm





Results





- Vital Signs


Recent Vital Signs: 


 Last Vital Signs











Temp  98 F   04/05/17 07:35


 


Pulse  76   04/05/17 07:35


 


Resp  17   04/05/17 07:35


 


BP  99/58 L  04/05/17 07:35


 


Pulse Ox  99   04/05/17 07:35














- Labs


Result Diagrams: 


 04/06/17 08:06





 04/06/17 08:06





Assessment & Plan


(1) Asthma exacerbation


Assessment and Plan: 


CXR 4-3-17 No active disease





Continue Duonab, Steroids, Montelukast, and antibiotics


R Venous Doppler


Insufficient peek flow; will continue to monitor





Monitor for changes in SOB, fever, and vitals


Status: Acute Med Rec complete per Pt at bedside.  Allergies reviewed and updated.  Home Pharmacy:  [facility]

## 2022-11-30 NOTE — ED NOTES
Assumed care of pt. Pt resting quietly. States he is feeling a little better. Requesting water. Pt advised of npo status.

## 2022-11-30 NOTE — ASSESSMENT & PLAN NOTE
Noted to be desaturating to about 88% upon sleeping. CXR negative  Consider evaluation for sleep apnea in the outpatient setting  Now resolved on room air.

## 2022-11-30 NOTE — ED NOTES
Hospitalist volodymyr complete. Cont to await inpt bed. Pt up to a chair. Able to stand and transfer independent. After several minutes of rocephin infusion pt became itchy all over, tachycardic w/ frequent PVCs and tachypenic. Infusion stopped. Hospitalist notifed. EKG complete. Pt assisted back to bed. NS infusing.

## 2022-11-30 NOTE — ED PROVIDER NOTES
ED Provider Note    CHIEF COMPLAINT  Chief Complaint   Patient presents with    Abdominal Pain     Arrives with c/o generalized intermittent abd pain with n/v/d/chills x 2 days   Possibly small amount of blood in emesis  States this has happened in the past before, hx of diverticulitis   Unable to tolerate PO intake     Nausea/Vomiting/Diarrhea       HPI  Gustavo Prieto is a 62 y.o. male who presents to the emerge department complaining of diffuse abdominal pain, nausea, vomiting, diarrhea.  Past medical history significant for known diverticulitis status post bowel resection completed 7 years ago by Dr. Sarabia.    Now over the last few days with the above symptoms.  States that he will have similar symptoms at least once or twice per year.  Believes it is related to his diverticular disease.  He questions possible small amount of blood in his vomitus this evening.  He has not been taking any over-the-counter medications and feels generally unwell.  No known sick contacts.  No recent travel.    Abdominal pain is currently moderate to severe and crampy in nature.    REVIEW OF SYSTEMS  See HPI for further details. All other systems are negative.     PAST MEDICAL HISTORY   has a past medical history of S/P colostomy (HCC) (2014).    SOCIAL HISTORY  Social History     Tobacco Use    Smoking status: Former     Packs/day: 0.50     Years: 35.00     Pack years: 17.50     Types: Cigarettes     Start date: 1977     Quit date: 2014     Years since quittin.8    Smokeless tobacco: Never   Substance and Sexual Activity    Alcohol use: Yes     Comment: occ    Drug use: Yes     Comment: marijuana    Sexual activity: Not on file       SURGICAL HISTORY   has a past surgical history that includes exploratory laparotomy (1/15/2014); colon resection (1/15/2014); and colostomy takedown (2014).    CURRENT MEDICATIONS  Home Medications       Reviewed by Ronny Peck R.N. (Registered Nurse) on 22 at 1844  Med  "List Status: Not Addressed     Medication Last Dose Status        Patient Sha Taking any Medications                           ALLERGIES  No Known Allergies    PHYSICAL EXAM  VITAL SIGNS: /80   Pulse 88   Temp 36.9 °C (98.5 °F) (Temporal)   Resp 20   Ht 1.753 m (5' 9\")   Wt 77.7 kg (171 lb 4.8 oz)   SpO2 92%   BMI 25.30 kg/m²  @HERNANDEZ[[id number]::@   Pulse ox interpretation:I interpret this pulse ox as normal.  Constitutional: Alert in no apparent distress.  HENT: No signs of trauma, Bilateral external ears normal, Nose normal.   Eyes: Pupils are equal and reactive  Neck: Normal range of motion, No tenderness, Supple  Cardiovascular: Regular rate and rhythm, no murmurs.   Thorax & Lungs: Normal breath sounds, No respiratory distress, No wheezing, No chest tenderness.   Abdomen: Bowel sounds normal, Soft, diffuse mild tenderness.  Skin: Warm, Dry, No erythema, No rash.   Back: No bony tenderness, No CVA tenderness.   Extremities: Intact distal pulses, No edema  Musculoskeletal: Good range of motion in all major joints. No tenderness to palpation or major deformities noted.   Neurologic: Alert , Normal motor function, Normal sensory function, No focal deficits noted.   Psychiatric: Affect normal, Judgment normal, Mood normal.       DIAGNOSTIC STUDIES / PROCEDURES      LABS  Results for orders placed or performed during the hospital encounter of 11/29/22   CBC WITH DIFFERENTIAL   Result Value Ref Range    WBC 14.5 (H) 4.8 - 10.8 K/uL    RBC 6.02 4.70 - 6.10 M/uL    Hemoglobin 18.0 14.0 - 18.0 g/dL    Hematocrit 51.4 42.0 - 52.0 %    MCV 85.4 81.4 - 97.8 fL    MCH 29.9 27.0 - 33.0 pg    MCHC 35.0 33.7 - 35.3 g/dL    RDW 43.0 35.9 - 50.0 fL    Platelet Count 411 164 - 446 K/uL    MPV 10.1 9.0 - 12.9 fL    Neutrophils-Polys 73.50 (H) 44.00 - 72.00 %    Lymphocytes 19.20 (L) 22.00 - 41.00 %    Monocytes 6.50 0.00 - 13.40 %    Eosinophils 0.00 0.00 - 6.90 %    Basophils 0.10 0.00 - 1.80 %    Immature Granulocytes " 0.70 0.00 - 0.90 %    Nucleated RBC 0.00 /100 WBC    Neutrophils (Absolute) 10.65 (H) 1.82 - 7.42 K/uL    Lymphs (Absolute) 2.79 1.00 - 4.80 K/uL    Monos (Absolute) 0.94 (H) 0.00 - 0.85 K/uL    Eos (Absolute) 0.00 0.00 - 0.51 K/uL    Baso (Absolute) 0.02 0.00 - 0.12 K/uL    Immature Granulocytes (abs) 0.10 0.00 - 0.11 K/uL    NRBC (Absolute) 0.00 K/uL   COMP METABOLIC PANEL   Result Value Ref Range    Sodium 132 (L) 135 - 145 mmol/L    Potassium 4.0 3.6 - 5.5 mmol/L    Chloride 99 96 - 112 mmol/L    Co2 12 (L) 20 - 33 mmol/L    Anion Gap 21.0 (H) 7.0 - 16.0    Glucose 159 (H) 65 - 99 mg/dL    Bun 26 (H) 8 - 22 mg/dL    Creatinine 1.59 (H) 0.50 - 1.40 mg/dL    Calcium 10.4 8.5 - 10.5 mg/dL    AST(SGOT) 34 12 - 45 U/L    ALT(SGPT) 70 (H) 2 - 50 U/L    Alkaline Phosphatase 116 (H) 30 - 99 U/L    Total Bilirubin 0.9 0.1 - 1.5 mg/dL    Albumin 5.1 (H) 3.2 - 4.9 g/dL    Total Protein 8.3 (H) 6.0 - 8.2 g/dL    Globulin 3.2 1.9 - 3.5 g/dL    A-G Ratio 1.6 g/dL   LIPASE   Result Value Ref Range    Lipase 19 11 - 82 U/L   URINALYSIS    Specimen: Urine   Result Value Ref Range    Color Yellow     Character Clear     Specific Gravity 1.025 <1.035    Ph 5.5 5.0 - 8.0    Glucose Negative Negative mg/dL    Ketones Negative Negative mg/dL    Protein 30 (A) Negative mg/dL    Bilirubin Negative Negative    Urobilinogen, Urine 0.2 Negative    Nitrite Negative Negative    Leukocyte Esterase Negative Negative    Occult Blood Small (A) Negative    Micro Urine Req Microscopic    ESTIMATED GFR   Result Value Ref Range    GFR (CKD-EPI) 49 (A) >60 mL/min/1.73 m 2   COV-2, FLU A/B, AND RSV BY PCR (2-4 HOURS CEPHEID): Collect NP swab in VTM    Specimen: Nasopharyngeal; Respirate   Result Value Ref Range    Influenza virus A RNA Negative Negative    Influenza virus B, PCR Negative Negative    RSV, PCR Negative Negative    SARS-CoV-2 by PCR NotDetected     SARS-CoV-2 Source NP Swab    URINE MICROSCOPIC (W/UA)   Result Value Ref Range    WBC  Rare (A) /hpf    RBC Rare /hpf    Bacteria Rare (A) None /hpf    Epithelial Cells Negative /hpf    Hyaline Cast 0-2 /lpf         RADIOLOGY  DX-CHEST-PORTABLE (1 VIEW)   Final Result         1.  No acute cardiopulmonary disease.      CT-ABDOMEN-PELVIS WITH   Final Result         1.  Mild terminal ileum wall thickening, consider component of terminal ileitis.   2.  Bilateral adrenal nodules, indeterminate but similar to prior study. Follow-up with adrenal protocol CT or MRI for further characterization as clinically appropriate.   3.  Hepatomegaly and diffuse hepatic steatosis   4.  Atherosclerosis              COURSE & MEDICAL DECISION MAKING  Pertinent Labs & Imaging studies reviewed. (See chart for details)    62-year-old male presented emerged part with the above presentation.  Prior history of bowel inflammatory disease and prior bowel resection.  Tonight CT imaging does not show bowel obstruction or other acute surgical issue however he does have segmental ileitis.  Furthermore he has persistent nausea and vomiting and p.o. intolerance.  Very mild JAG.  During this ER work-up and observational time the patient did not have oxygen desaturation while sleeping.  Possible sleep apnea.  Chest x-ray largely unremarkable.  Viral panel has been ordered but I have a lower suspicion for this.  At this point I will have him brought in under the hospital service for ongoing hydration and care.        FINAL IMPRESSION  1. Nausea and vomiting, unspecified vomiting type    2. Ileitis    3. JAG (acute kidney injury) (HCC)    4. Hypoxia            Electronically signed by: Eduard Gaona M.D., 11/29/2022 9:30 PM

## 2022-11-30 NOTE — ED NOTES
"Pt ambulatory back to rm 15. Changed into gown, placed on cardiac monitor. Pt reports chills, red-hued vomit noted in emesis bag. Pt states symptoms x2 days, but hx of same \"about 2 times a year\". Skin warm, IV placed. Spouse at bedside.   "

## 2022-11-30 NOTE — H&P
Hospital Medicine History & Physical Note    Date of Service  11/30/2022    Primary Care Physician  Pcp Pt States None    Consultants  None    Code Status  Full Code    Chief Complaint  Chief Complaint   Patient presents with    Abdominal Pain     Arrives with c/o generalized intermittent abd pain with n/v/d/chills x 2 days   Possibly small amount of blood in emesis  States this has happened in the past before, hx of diverticulitis   Unable to tolerate PO intake     Nausea/Vomiting/Diarrhea       History of Presenting Illness  Gustavo Prieto is a 62 y.o. male who presented 11/29/2022 with intractable nausea and vomiting for the last 3 days.  Noted to have above symptoms and a dull epigastric abdominal pain during this duration.  Associated with chills.  Denies sick contacts.  Denies recent travels.  Denies illicit drug use.    In the ED, patient initially tachycardic.  Found to have white count 14.5.  JAG noted.  UA negative.  COVID and flu negative.  Chest x-ray showed no acute cardiopulmonary process.    I discussed the plan of care with patient.    Review of Systems  Review of Systems   Constitutional:  Positive for malaise/fatigue.   HENT: Negative.     Eyes: Negative.    Respiratory: Negative.     Cardiovascular: Negative.    Gastrointestinal:  Positive for abdominal pain, nausea and vomiting.   Genitourinary: Negative.    Musculoskeletal: Negative.    Skin: Negative.    Neurological: Negative.    Endo/Heme/Allergies: Negative.    Psychiatric/Behavioral: Negative.       Past Medical History   has a past medical history of S/P colostomy (HCC) (1/2014).    Surgical History   has a past surgical history that includes exploratory laparotomy (1/15/2014); colon resection (1/15/2014); and colostomy takedown (6/9/2014).     Family History  family history includes Diabetes in his father; Heart Attack in his father.   Family history reviewed with patient. There is no family history that is pertinent to the chief  complaint.     Social History   reports that he quit smoking about 8 years ago. His smoking use included cigarettes. He started smoking about 45 years ago. He has a 17.50 pack-year smoking history. He has never used smokeless tobacco. He reports current alcohol use. He reports current drug use.    Allergies  No Known Allergies    Medications  None       Physical Exam  Temp:  [36.3 °C (97.4 °F)-36.9 °C (98.5 °F)] 36.9 °C (98.5 °F)  Pulse:  [] 88  Resp:  [14-23] 20  BP: (130-170)/() 130/80  SpO2:  [91 %-98 %] 92 %  Blood Pressure: 130/80   Temperature: 36.9 °C (98.5 °F)   Pulse: 88   Respiration: 20   Pulse Oximetry: 92 %       Physical Exam  Constitutional:       Appearance: Normal appearance. He is normal weight.   HENT:      Head: Normocephalic.      Nose: Nose normal.      Mouth/Throat:      Mouth: Mucous membranes are moist.   Cardiovascular:      Rate and Rhythm: Normal rate and regular rhythm.      Pulses: Normal pulses.   Pulmonary:      Effort: Pulmonary effort is normal.      Breath sounds: Normal breath sounds.   Abdominal:      General: Abdomen is flat. Bowel sounds are normal.      Palpations: Abdomen is soft.      Comments: Endorses pain upon palpation of epigastric area.  No rigidity/guarding/peritoneal signs noted   Neurological:      Mental Status: He is alert.       Laboratory:  Recent Labs     11/29/22 1855   WBC 14.5*   RBC 6.02   HEMOGLOBIN 18.0   HEMATOCRIT 51.4   MCV 85.4   MCH 29.9   MCHC 35.0   RDW 43.0   PLATELETCT 411   MPV 10.1     Recent Labs     11/29/22 1855   SODIUM 132*   POTASSIUM 4.0   CHLORIDE 99   CO2 12*   GLUCOSE 159*   BUN 26*   CREATININE 1.59*   CALCIUM 10.4     Recent Labs     11/29/22 1855   ALTSGPT 70*   ASTSGOT 34   ALKPHOSPHAT 116*   TBILIRUBIN 0.9   LIPASE 19   GLUCOSE 159*         No results for input(s): NTPROBNP in the last 72 hours.      No results for input(s): TROPONINT in the last 72 hours.    Imaging:  CT-ABDOMEN-PELVIS WITH   Final Result          1.  Mild terminal ileum wall thickening, consider component of terminal ileitis.   2.  Bilateral adrenal nodules, indeterminate but similar to prior study. Follow-up with adrenal protocol CT or MRI for further characterization as clinically appropriate.   3.  Hepatomegaly and diffuse hepatic steatosis   4.  Atherosclerosis      DX-CHEST-PORTABLE (1 VIEW)    (Results Pending)       X-Ray:  I have personally reviewed the images and compared with prior images.    Assessment/Plan:  Justification for Admission Status  I anticipate this patient is appropriate for observation status at this time because noted to have JAG in setting of nausea and vomiting      * Intractable nausea and vomiting- (present on admission)  Assessment & Plan  Admit patient to medical floor  CAT scan abdomen showing inflammation of the terminal ileum. ?  Possible Crohn's disease.  Has never been told he has Crohn's disease in the past.  Consider GI consult upon discharge  Can give 1 dose of Rocephin and Flagyl now.  Send procalcitonin.  Fluids  Zofran    Hypoxia  Assessment & Plan  Noted to be desaturating to about 88% upon sleeping. CXR negative  Consider evaluation for sleep apnea in the outpatient setting      JAG (acute kidney injury) (HCC)- (present on admission)  Assessment & Plan  From dehydration  Fluids  Serial BMP     Hyponatremia- (present on admission)  Assessment & Plan  Likely from hypovolemic hyponatremia  Fluids  Serial BMP    Tobacco abuse  Assessment & Plan  Denies history of COPD.  Smoked for 45 years.  Quit earlier this year      VTE prophylaxis: enoxaparin ppx

## 2022-11-30 NOTE — PROGRESS NOTES
Hospital Medicine Daily Progress Note    Date of Service  11/30/2022    Chief Complaint  Gustavo Prieto is a 62 y.o. male admitted 11/29/2022 with N/V    Hospital Course  61 yo man with bowel resection after diverticulitis who presented with 2 days of N/V/D. CT AP showed possible terminal ileitis and bilateral adrenal nodules, hepatomegaly. He was admitted for intractable N/V and JAG.     Interval Problem Update  Admitted after midnight  He still feels nauseous but wants to try drinking  Clear liquids ordered  Ordered for Ceftriaxone and flagyl  Stool culture ordered  Continue IVF and recheck renal function tomorrow    I have discussed this patient's plan of care and discharge plan at IDT rounds today with Case Management, Nursing, Nursing leadership, and other members of the IDT team.    Consultants/Specialty  none    Code Status  Full Code    Disposition  Patient is not medically cleared for discharge.   Anticipate discharge to to home with close outpatient follow-up.  I have placed the appropriate orders for post-discharge needs.    Review of Systems  Review of Systems   Constitutional:  Positive for malaise/fatigue.   Gastrointestinal:  Positive for abdominal pain, diarrhea, nausea and vomiting.   Neurological:  Positive for weakness.      Physical Exam  Temp:  [36.3 °C (97.4 °F)-36.9 °C (98.5 °F)] 36.9 °C (98.5 °F)  Pulse:  [] 76  Resp:  [14-27] 15  BP: (117-171)/() 117/71  SpO2:  [81 %-98 %] 97 %    Physical Exam  Vitals and nursing note reviewed.   Constitutional:       Appearance: He is ill-appearing. He is not toxic-appearing or diaphoretic.   HENT:      Head: Normocephalic.      Mouth/Throat:      Mouth: Mucous membranes are dry.   Eyes:      General:         Right eye: No discharge.         Left eye: No discharge.   Cardiovascular:      Rate and Rhythm: Normal rate and regular rhythm.   Pulmonary:      Effort: Pulmonary effort is normal. No respiratory distress.      Breath sounds: No  wheezing or rales.   Abdominal:      Palpations: Abdomen is soft.      Tenderness: There is abdominal tenderness (RLQ). There is no guarding or rebound.   Musculoskeletal:         General: No swelling.      Cervical back: Neck supple.   Skin:     General: Skin is warm and dry.   Neurological:      Mental Status: He is alert and oriented to person, place, and time.       Fluids  No intake or output data in the 24 hours ending 11/30/22 0653    Laboratory  Recent Labs     11/29/22  1855   WBC 14.5*   RBC 6.02   HEMOGLOBIN 18.0   HEMATOCRIT 51.4   MCV 85.4   MCH 29.9   MCHC 35.0   RDW 43.0   PLATELETCT 411   MPV 10.1     Recent Labs     11/29/22  1855   SODIUM 132*   POTASSIUM 4.0   CHLORIDE 99   CO2 12*   GLUCOSE 159*   BUN 26*   CREATININE 1.59*   CALCIUM 10.4                   Imaging  DX-CHEST-PORTABLE (1 VIEW)   Final Result         1.  No acute cardiopulmonary disease.      CT-ABDOMEN-PELVIS WITH   Final Result         1.  Mild terminal ileum wall thickening, consider component of terminal ileitis.   2.  Bilateral adrenal nodules, indeterminate but similar to prior study. Follow-up with adrenal protocol CT or MRI for further characterization as clinically appropriate.   3.  Hepatomegaly and diffuse hepatic steatosis   4.  Atherosclerosis           Assessment/Plan  * Intractable nausea and vomiting- (present on admission)  Assessment & Plan  Admit patient to medical floor  CAT scan abdomen showing inflammation of the terminal ileum. ?  Possible Crohn's disease.  Has never been told he has Crohn's disease in the past.  Consider GI consult upon discharge  Can give 1 dose of Rocephin and Flagyl now.  Send procalcitonin.  Fluids  Zofran    Hypoxia  Assessment & Plan  Noted to be desaturating to about 88% upon sleeping. CXR negative  Consider evaluation for sleep apnea in the outpatient setting      JAG (acute kidney injury) (HCC)- (present on admission)  Assessment & Plan  From dehydration  Fluids  Serial BMP      Hyponatremia- (present on admission)  Assessment & Plan  Likely from hypovolemic hyponatremia  Fluids  Serial BMP    Tobacco abuse  Assessment & Plan  Denies history of COPD.  Smoked for 45 years.  Quit earlier this year         VTE prophylaxis: enoxaparin ppx    I have performed a physical exam and reviewed and updated ROS and Plan today (11/30/2022). In review of yesterday's note (11/29/2022), there are no changes except as documented above.

## 2022-11-30 NOTE — ED NOTES
Covid swab obtained and sent to lab. Pt said he wanted the tylenol, it was opened and handed to pt than pt decided he didn't want the meds.

## 2022-12-01 LAB
ANION GAP SERPL CALC-SCNC: 11 MMOL/L (ref 7–16)
BUN SERPL-MCNC: 19 MG/DL (ref 8–22)
CALCIUM SERPL-MCNC: 8.3 MG/DL (ref 8.5–10.5)
CHLORIDE SERPL-SCNC: 107 MMOL/L (ref 96–112)
CO2 SERPL-SCNC: 19 MMOL/L (ref 20–33)
CREAT SERPL-MCNC: 1.19 MG/DL (ref 0.5–1.4)
ERYTHROCYTE [DISTWIDTH] IN BLOOD BY AUTOMATED COUNT: 44.3 FL (ref 35.9–50)
GFR SERPLBLD CREATININE-BSD FMLA CKD-EPI: 70 ML/MIN/1.73 M 2
GLUCOSE SERPL-MCNC: 119 MG/DL (ref 65–99)
HCT VFR BLD AUTO: 44.2 % (ref 42–52)
HGB BLD-MCNC: 15.3 G/DL (ref 14–18)
MCH RBC QN AUTO: 30.2 PG (ref 27–33)
MCHC RBC AUTO-ENTMCNC: 34.6 G/DL (ref 33.7–35.3)
MCV RBC AUTO: 87.2 FL (ref 81.4–97.8)
PLATELET # BLD AUTO: 314 K/UL (ref 164–446)
PMV BLD AUTO: 10.3 FL (ref 9–12.9)
POTASSIUM SERPL-SCNC: 3.7 MMOL/L (ref 3.6–5.5)
RBC # BLD AUTO: 5.07 M/UL (ref 4.7–6.1)
SODIUM SERPL-SCNC: 137 MMOL/L (ref 135–145)
WBC # BLD AUTO: 8.8 K/UL (ref 4.8–10.8)

## 2022-12-01 PROCEDURE — 700102 HCHG RX REV CODE 250 W/ 637 OVERRIDE(OP): Performed by: INTERNAL MEDICINE

## 2022-12-01 PROCEDURE — 85027 COMPLETE CBC AUTOMATED: CPT

## 2022-12-01 PROCEDURE — A9270 NON-COVERED ITEM OR SERVICE: HCPCS | Performed by: STUDENT IN AN ORGANIZED HEALTH CARE EDUCATION/TRAINING PROGRAM

## 2022-12-01 PROCEDURE — 700102 HCHG RX REV CODE 250 W/ 637 OVERRIDE(OP): Performed by: STUDENT IN AN ORGANIZED HEALTH CARE EDUCATION/TRAINING PROGRAM

## 2022-12-01 PROCEDURE — 700111 HCHG RX REV CODE 636 W/ 250 OVERRIDE (IP): Performed by: STUDENT IN AN ORGANIZED HEALTH CARE EDUCATION/TRAINING PROGRAM

## 2022-12-01 PROCEDURE — 97161 PT EVAL LOW COMPLEX 20 MIN: CPT

## 2022-12-01 PROCEDURE — A9270 NON-COVERED ITEM OR SERVICE: HCPCS | Performed by: INTERNAL MEDICINE

## 2022-12-01 PROCEDURE — 80048 BASIC METABOLIC PNL TOTAL CA: CPT

## 2022-12-01 PROCEDURE — 99232 SBSQ HOSP IP/OBS MODERATE 35: CPT | Performed by: INTERNAL MEDICINE

## 2022-12-01 PROCEDURE — 770001 HCHG ROOM/CARE - MED/SURG/GYN PRIV*

## 2022-12-01 PROCEDURE — 700105 HCHG RX REV CODE 258: Performed by: INTERNAL MEDICINE

## 2022-12-01 RX ADMIN — SODIUM CHLORIDE 1000 ML: 9 INJECTION, SOLUTION INTRAVENOUS at 13:01

## 2022-12-01 RX ADMIN — ZOLPIDEM TARTRATE 5 MG: 5 TABLET ORAL at 21:11

## 2022-12-01 RX ADMIN — SODIUM CHLORIDE: 9 INJECTION, SOLUTION INTRAVENOUS at 05:03

## 2022-12-01 RX ADMIN — PROCHLORPERAZINE EDISYLATE 10 MG: 5 INJECTION INTRAMUSCULAR; INTRAVENOUS at 00:27

## 2022-12-01 RX ADMIN — PROCHLORPERAZINE EDISYLATE 10 MG: 5 INJECTION INTRAMUSCULAR; INTRAVENOUS at 08:36

## 2022-12-01 RX ADMIN — ENOXAPARIN SODIUM 40 MG: 40 INJECTION SUBCUTANEOUS at 18:22

## 2022-12-01 RX ADMIN — ZOLPIDEM TARTRATE 5 MG: 5 TABLET ORAL at 00:31

## 2022-12-01 RX ADMIN — SODIUM CHLORIDE: 9 INJECTION, SOLUTION INTRAVENOUS at 21:12

## 2022-12-01 RX ADMIN — METRONIDAZOLE 500 MG: 500 TABLET ORAL at 00:31

## 2022-12-01 ASSESSMENT — COGNITIVE AND FUNCTIONAL STATUS - GENERAL
MOBILITY SCORE: 24
SUGGESTED CMS G CODE MODIFIER MOBILITY: CH

## 2022-12-01 ASSESSMENT — ENCOUNTER SYMPTOMS
ORTHOPNEA: 0
PALPITATIONS: 0
SENSORY CHANGE: 0
FOCAL WEAKNESS: 0
DIARRHEA: 0
COUGH: 0
CONSTIPATION: 0
FEVER: 0
SHORTNESS OF BREATH: 0
NECK PAIN: 0
PHOTOPHOBIA: 0
SPUTUM PRODUCTION: 0
HALLUCINATIONS: 0
ABDOMINAL PAIN: 1
DOUBLE VISION: 0
HEADACHES: 0
DIZZINESS: 0
BLURRED VISION: 0
SPEECH CHANGE: 0
BACK PAIN: 0
NAUSEA: 1
CHILLS: 0
TINGLING: 0
TREMORS: 0
VOMITING: 1
MYALGIAS: 0
WEIGHT LOSS: 0
EYE PAIN: 0

## 2022-12-01 ASSESSMENT — PATIENT HEALTH QUESTIONNAIRE - PHQ9
2. FEELING DOWN, DEPRESSED, IRRITABLE, OR HOPELESS: NOT AT ALL
SUM OF ALL RESPONSES TO PHQ9 QUESTIONS 1 AND 2: 0
1. LITTLE INTEREST OR PLEASURE IN DOING THINGS: NOT AT ALL

## 2022-12-01 ASSESSMENT — GAIT ASSESSMENTS
DISTANCE (FEET): 200
GAIT LEVEL OF ASSIST: INDEPENDENT

## 2022-12-01 ASSESSMENT — LIFESTYLE VARIABLES
CONSUMPTION TOTAL: NEGATIVE
TOTAL SCORE: 0
EVER HAD A DRINK FIRST THING IN THE MORNING TO STEADY YOUR NERVES TO GET RID OF A HANGOVER: NO
DOES PATIENT WANT TO STOP DRINKING: NO
ALCOHOL_USE: NO
HAVE YOU EVER FELT YOU SHOULD CUT DOWN ON YOUR DRINKING: NO
AVERAGE NUMBER OF DAYS PER WEEK YOU HAVE A DRINK CONTAINING ALCOHOL: 0
TOTAL SCORE: 0
HAVE PEOPLE ANNOYED YOU BY CRITICIZING YOUR DRINKING: NO
TOTAL SCORE: 0
SUBSTANCE_ABUSE: 0
ON A TYPICAL DAY WHEN YOU DRINK ALCOHOL HOW MANY DRINKS DO YOU HAVE: 0
HOW MANY TIMES IN THE PAST YEAR HAVE YOU HAD 5 OR MORE DRINKS IN A DAY: 0
EVER FELT BAD OR GUILTY ABOUT YOUR DRINKING: NO

## 2022-12-01 NOTE — PROGRESS NOTES
[facility] Medicine Daily Progress Note    Date of Service  12/1/2022    Chief Complaint  Gustavo Prieto is a 62 y.o. male admitted 11/29/2022 with N/V    Hospital Course  61 yo man with bowel resection after diverticulitis who presented with 2 days of N/V/D. CT AP showed possible terminal ileitis and bilateral adrenal nodules, hepatomegaly. He was admitted for intractable N/V and JAG.  He was placed on IV antibiotics with ceftriaxone and Flagyl.  Patient developed generalized rash with the use of IV ceftriaxone and it was discontinued.  On December 1, 2022 I discontinued Flagyl as his symptoms most likely secondary to viral infection and his white blood cell count is within normal limits.    Interval Problem Update    I evaluated and examined him at the bedside  He reported that he is feeling slightly better.  I discussed with him that I discontinued his Flagyl and he expressed that he has been having nausea with Flagyl.  Continue IV fluid and advance diet as tolerated.  I discontinued his antibiotics   I discussed plan of care with bedside RN.    I have discussed this patient's plan of care and discharge plan at IDT rounds today with Case Management, Nursing, Nursing leadership, and other members of the IDT team.    Consultants/Specialty  none    Code Status  Full Code    Disposition  Patient is not medically cleared for discharge.   Anticipate discharge to to home with close outpatient follow-up.  I have placed the appropriate orders for post-discharge needs.    Review of Systems  Review of Systems   Constitutional:  Negative for chills, fever and weight loss.   HENT:  Negative for hearing loss and tinnitus.    Eyes:  Negative for blurred vision, double vision, photophobia and pain.   Respiratory:  Negative for cough, sputum production and shortness of breath.    Cardiovascular:  Negative for chest pain, palpitations, orthopnea and leg swelling.   Gastrointestinal:  Positive for abdominal pain, nausea and  vomiting. Negative for constipation and diarrhea.   Genitourinary:  Negative for dysuria, frequency and urgency.   Musculoskeletal:  Negative for back pain, joint pain, myalgias and neck pain.   Skin:  Negative for rash.   Neurological:  Negative for dizziness, tingling, tremors, sensory change, speech change, focal weakness and headaches.   Psychiatric/Behavioral:  Negative for hallucinations and substance abuse.    All other systems reviewed and are negative.     Physical Exam  Temp:  [36.1 °C (97 °F)-36.6 °C (97.8 °F)] 36.1 °C (97 °F)  Pulse:  [] 85  Resp:  [14-18] 14  BP: (107-165)/(59-94) 107/59  SpO2:  [95 %-96 %] 95 %    Physical Exam  Vitals reviewed.   Constitutional:       General: He is not in acute distress.     Appearance: He is ill-appearing.   HENT:      Head: Normocephalic and atraumatic. No contusion.      Right Ear: External ear normal.      Left Ear: External ear normal.      Nose: Nose normal.      Mouth/Throat:      Pharynx: No oropharyngeal exudate.   Eyes:      General:         Right eye: No discharge.         Left eye: No discharge.      Pupils: Pupils are equal, round, and reactive to light.   Cardiovascular:      Rate and Rhythm: Normal rate and regular rhythm.      Heart sounds: No murmur heard.    No friction rub. No gallop.   Pulmonary:      Effort: Pulmonary effort is normal.      Breath sounds: No wheezing or rhonchi.   Abdominal:      General: Bowel sounds are normal. There is no distension.      Palpations: Abdomen is soft.      Tenderness: There is no abdominal tenderness. There is no rebound.   Musculoskeletal:         General: No swelling or tenderness. Normal range of motion.      Cervical back: No rigidity. No muscular tenderness.   Skin:     General: Skin is warm and dry.      Coloration: Skin is not jaundiced.   Neurological:      General: No focal deficit present.      Mental Status: He is alert and oriented to person, place, and time.      Cranial Nerves: No cranial  nerve deficit.      Sensory: No sensory deficit.      Comments: Motor 5/5 in all 4 extremities   Psychiatric:         Mood and Affect: Mood normal.       Fluids    Intake/Output Summary (Last 24 hours) at 12/1/2022 1541  Last data filed at 12/1/2022 0845  Gross per 24 hour   Intake 360 ml   Output 501 ml   Net -141 ml       Laboratory  Recent Labs     11/29/22  1855 12/01/22  0410   WBC 14.5* 8.8   RBC 6.02 5.07   HEMOGLOBIN 18.0 15.3   HEMATOCRIT 51.4 44.2   MCV 85.4 87.2   MCH 29.9 30.2   MCHC 35.0 34.6   RDW 43.0 44.3   PLATELETCT 411 314   MPV 10.1 10.3       Recent Labs     11/29/22  1855 12/01/22  0410   SODIUM 132* 137   POTASSIUM 4.0 3.7   CHLORIDE 99 107   CO2 12* 19*   GLUCOSE 159* 119*   BUN 26* 19   CREATININE 1.59* 1.19   CALCIUM 10.4 8.3*                     Imaging  DX-CHEST-PORTABLE (1 VIEW)   Final Result         1.  No acute cardiopulmonary disease.      CT-ABDOMEN-PELVIS WITH   Final Result         1.  Mild terminal ileum wall thickening, consider component of terminal ileitis.   2.  Bilateral adrenal nodules, indeterminate but similar to prior study. Follow-up with adrenal protocol CT or MRI for further characterization as clinically appropriate.   3.  Hepatomegaly and diffuse hepatic steatosis   4.  Atherosclerosis             Assessment/Plan  * Intractable nausea and vomiting- (present on admission)  Assessment & Plan  Admit patient to medical floor  CAT scan abdomen showing inflammation of the terminal ileum. ?  Possible Crohn's disease.  Has never been told he has Crohn's disease in the past.  Consider GI consult upon discharge  Symptoms has been improving.  I discontinued antibiotics  White blood cell count is within normal limits.  Started him on full liquid diet advance diet as tolerated.    Hypoxia  Assessment & Plan  Noted to be desaturating to about 88% upon sleeping. CXR negative  Consider evaluation for sleep apnea in the outpatient setting  Now resolved on room air.      JAG (acute  kidney injury) (HCC)- (present on admission)  Assessment & Plan  From dehydration  Fluids  Now resolved.    Hyponatremia- (present on admission)  Assessment & Plan  Likely from hypovolemic hyponatremia  Now resolved.    Tobacco abuse  Assessment & Plan  Denies history of COPD.  Smoked for 45 years.  Quit earlier this year      I discussed plan of care with bedside RN.    VTE prophylaxis: enoxaparin ppx    I have performed a physical exam and reviewed and updated ROS and Plan today (12/1/2022). In review of yesterday's note (11/30/2022), there are no changes except as documented above.

## 2022-12-01 NOTE — CARE PLAN
The patient is Stable - Low risk of patient condition declining or worsening    Shift Goals  Clinical Goals: IVF, labs, nausea control  Patient Goals: nausea control  Family Goals: everton    Progress made toward(s) clinical / shift goals:    Problem: Pain - Standard  Goal: Alleviation of pain or a reduction in pain to the patient’s comfort goal  Outcome: Progressing     Problem: Knowledge Deficit - Standard  Goal: Patient and family/care givers will demonstrate understanding of plan of care, disease process/condition, diagnostic tests and medications  Outcome: Progressing     Problem: Fluid Volume  Goal: Fluid volume balance will be maintained  Outcome: Progressing     Problem: Gastrointestinal Irritability  Goal: Nausea and vomiting will be absent or improve  Outcome: Progressing  Goal: Diarrhea will be absent or improved  Outcome: Progressing

## 2022-12-01 NOTE — CARE PLAN
The patient is Stable - Low risk of patient condition declining or worsening    Shift Goals  Clinical Goals: ivf, monitor n/v    Progress made toward(s) clinical / shift goals:        Problem: Pain - Standard  Goal: Alleviation of pain or a reduction in pain to the patient’s comfort goal  Outcome: Progressing     Problem: Knowledge Deficit - Standard  Goal: Patient and family/care givers will demonstrate understanding of plan of care, disease process/condition, diagnostic tests and medications  Outcome: Progressing     Problem: Fluid Volume  Goal: Fluid volume balance will be maintained  Outcome: Progressing     Problem: Gastrointestinal Irritability  Goal: Diarrhea will be absent or improved  Outcome: Progressing       Patient is not progressing towards the following goals:      Problem: Gastrointestinal Irritability  Goal: Nausea and vomiting will be absent or improve  Outcome: Not Met

## 2022-12-01 NOTE — PROGRESS NOTES
4 Eyes Skin Assessment Completed by JUAN DAVID Swann and JUAN DAVID Cardona.    Head WDL  Ears WDL  Nose WDL  Mouth WDL  Neck WDL  Breast/Chest WDL  Shoulder Blades WDL  Spine WDL  (R) Arm/Elbow/Hand WDL  (L) Arm/Elbow/Hand WDL  Abdomen WDL  Groin WDL  Scrotum/Coccyx/Buttocks WDL  (R) Leg WDL  (L) Leg WDL  (R) Heel/Foot/Toe WDL  (L) Heel/Foot/Toe WDL        Interventions In Place Pillows    Possible Skin Injury No    Pictures Uploaded Into Epic N/A  Wound Consult Placed N/A  RN Wound Prevention Protocol Ordered No

## 2022-12-01 NOTE — THERAPY
Physical Therapy   Initial Evaluation     Patient Name: Gustavo Prieto  Age:  62 y.o., Sex:  male  Medical Record #: [id number]  Today's Date: 2022          Assessment  PT eval order received, chart reviewed, RN cleared pt for PT evaluation. Pt was ID via name and  and was agreeable to PT evaluation. Pt is a 63 y/o m admitted on  with n/v. Dx: intractable n/v, hypoxia, JAG, hyponatramie.  PMH includes but is not limited to: colostomy. Please refer to chart for full details.      PT eval completed. Pt demo complete independence with all asepcts of bed mobility, standing transfers, ambulating community level distances without AD independently. Based on above performance no further skilled PT service are indicated at this time. Defer to nursing staff to continue to ambulate with patient during his stay to prevent secondary complications.  Upon completion pt was left back in bed, personal needs met, CLIR, handoff to RN completed. Per RN no alarm necessary. PT signing off.     Plan    Recommend Physical Therapy for Evaluation only     DC Equipment Recommendations: None  Discharge Recommendations: Anticipate that the patient will have no further physical therapy needs after discharge from the hospital     Objective     22 0755   Charge Group   PT Evaluation PT Evaluation Low   Total Time Spent   PT Total Time Yes   PT Evaluation Time Spent (Mins) 10   PT Total Time Spent (Calculated) 10   Initial Contact Note    Initial Contact Note Order Received and Verified, Evaluation Only - Patient Does Not Require Further Acute Physical Therapy at this Time.  However, May Benefit from Post Acute Therapy for Higher Level Functional Deficits.   Vitals   Pulse 85   Room Air Oximetry 94   O2 (LPM) 0   O2 Delivery Device None - Room Air   Prior Living Situation   Prior Services None   Housing / Facility 1 Story House   Lives with - Patient's Self Care Capacity Spouse   Comments lives [facility] with wife ( works during  the day), no JANETH. Pt does drive, does work as a senior technician.   Prior Level of Functional Mobility   Bed Mobility Independent   Transfer Status Independent   Ambulation Independent   Distance Ambulation (Feet)   (community level distances)   Stairs Independent   Comments PLOF independent no DME   History of Falls   History of Falls No   Cognition    Cognition / Consciousness WDL   Passive ROM Lower Body   Passive ROM Lower Body WDL   Active ROM Lower Body    Active ROM Lower Body  WDL   Strength Lower Body   Lower Body Strength  WDL   Sensation Lower Body   Lower Extremity Sensation   WDL   Balance Assessment   Sitting Balance (Static) Normal   Sitting Balance (Dynamic) Normal   Standing Balance (Static) Normal   Standing Balance (Dynamic) Normal   Weight Shift Sitting Normal   Weight Shift Standing Normal   Gait Analysis   Gait Level Of Assist Independent   Assistive Device None   Distance (Feet) 200   # of Times Distance was Traveled 1   Comments Pt ambulated independently in hallway no ' no gait deviations noted, good steady pace   Bed Mobility    Supine to Sit Independent   Sit to Supine Independent   Scooting Independent   Rolling Independent   Functional Mobility   Sit to Stand Independent   Toilet Transfers Independent   Comments STS independent. Ambulated to toilet no DME independently. Toilet transfer/toileting independent.   How much difficulty does the patient currently have...   Turning over in bed (including adjusting bedclothes, sheets and blankets)? 4   Sitting down on and standing up from a chair with arms (e.g., wheelchair, bedside commode, etc.) 4   Moving from lying on back to sitting on the side of the bed? 4   How much help from another person does the patient currently need...   Moving to and from a bed to a chair (including a wheelchair)? 4   Need to walk in a hospital room? 4   Climbing 3-5 steps with a railing? 4   6 clicks Mobility Score 24   Activity Tolerance   Standing no DME  "  Comments tolerated well   Patient / Family Goals    Patient / Family Goal #1 \" go home\"   Education Group   Education Provided Role of Physical Therapist   Role of Physical Therapist Patient Response Patient;Acceptance;Explanation;Verbal Demonstration   Problem List    Problems None   Anticipated Discharge Equipment and Recommendations   DC Equipment Recommendations None   Discharge Recommendations Anticipate that the patient will have no further physical therapy needs after discharge from the hospital   Interdisciplinary Plan of Care Collaboration   IDT Collaboration with  Nursing   Patient Position at End of Therapy In Bed;Tray Table within Reach;Family / Friend in Room;Phone within Reach;Other (Comments)  (per RN no alarms needed)   Collaboration Comments per RN no alarm necessary   Session Information   Date / Session Number  12/1- 1x eval only     "

## 2022-12-01 NOTE — PROGRESS NOTES
A&O x4  Room air  IVF  Nausea addressed per MAR, no vomiting or diarrhea this shift  Patient resting at 45-90 degrees, has not ambulated this shift dt nausea  Tolerated flagyl only with antiemetic, previous dose caused upset  Bed alarm refused  Uses urinal   Stool sample still uncollected as no BM

## 2022-12-02 ENCOUNTER — PHARMACY VISIT (OUTPATIENT)
Dept: PHARMACY | Facility: MEDICAL CENTER | Age: 62
End: 2022-12-02
Payer: COMMERCIAL

## 2022-12-02 VITALS
HEART RATE: 74 BPM | BODY MASS INDEX: 25.37 KG/M2 | OXYGEN SATURATION: 95 % | DIASTOLIC BLOOD PRESSURE: 68 MMHG | WEIGHT: 171.3 LBS | RESPIRATION RATE: 16 BRPM | TEMPERATURE: 97.9 F | HEIGHT: 69 IN | SYSTOLIC BLOOD PRESSURE: 110 MMHG

## 2022-12-02 LAB
ALBUMIN SERPL BCP-MCNC: 3.8 G/DL (ref 3.2–4.9)
ALBUMIN/GLOB SERPL: 1.8 G/DL
ALP SERPL-CCNC: 75 U/L (ref 30–99)
ALT SERPL-CCNC: 40 U/L (ref 2–50)
ANION GAP SERPL CALC-SCNC: 10 MMOL/L (ref 7–16)
AST SERPL-CCNC: 26 U/L (ref 12–45)
BILIRUB SERPL-MCNC: 0.7 MG/DL (ref 0.1–1.5)
BUN SERPL-MCNC: 11 MG/DL (ref 8–22)
CALCIUM SERPL-MCNC: 8.6 MG/DL (ref 8.5–10.5)
CHLORIDE SERPL-SCNC: 104 MMOL/L (ref 96–112)
CO2 SERPL-SCNC: 21 MMOL/L (ref 20–33)
CREAT SERPL-MCNC: 1 MG/DL (ref 0.5–1.4)
ERYTHROCYTE [DISTWIDTH] IN BLOOD BY AUTOMATED COUNT: 41.7 FL (ref 35.9–50)
GFR SERPLBLD CREATININE-BSD FMLA CKD-EPI: 86 ML/MIN/1.73 M 2
GLOBULIN SER CALC-MCNC: 2.1 G/DL (ref 1.9–3.5)
GLUCOSE SERPL-MCNC: 93 MG/DL (ref 65–99)
HCT VFR BLD AUTO: 42.3 % (ref 42–52)
HGB BLD-MCNC: 14.5 G/DL (ref 14–18)
MAGNESIUM SERPL-MCNC: 1.9 MG/DL (ref 1.5–2.5)
MCH RBC QN AUTO: 29.7 PG (ref 27–33)
MCHC RBC AUTO-ENTMCNC: 34.3 G/DL (ref 33.7–35.3)
MCV RBC AUTO: 86.7 FL (ref 81.4–97.8)
PLATELET # BLD AUTO: 309 K/UL (ref 164–446)
PMV BLD AUTO: 10.2 FL (ref 9–12.9)
POTASSIUM SERPL-SCNC: 3.3 MMOL/L (ref 3.6–5.5)
PROT SERPL-MCNC: 5.9 G/DL (ref 6–8.2)
RBC # BLD AUTO: 4.88 M/UL (ref 4.7–6.1)
SODIUM SERPL-SCNC: 135 MMOL/L (ref 135–145)
WBC # BLD AUTO: 9.2 K/UL (ref 4.8–10.8)

## 2022-12-02 PROCEDURE — 700102 HCHG RX REV CODE 250 W/ 637 OVERRIDE(OP)

## 2022-12-02 PROCEDURE — A9270 NON-COVERED ITEM OR SERVICE: HCPCS

## 2022-12-02 PROCEDURE — 85027 COMPLETE CBC AUTOMATED: CPT

## 2022-12-02 PROCEDURE — A9270 NON-COVERED ITEM OR SERVICE: HCPCS | Performed by: STUDENT IN AN ORGANIZED HEALTH CARE EDUCATION/TRAINING PROGRAM

## 2022-12-02 PROCEDURE — 80053 COMPREHEN METABOLIC PANEL: CPT

## 2022-12-02 PROCEDURE — 99239 HOSP IP/OBS DSCHRG MGMT >30: CPT | Performed by: STUDENT IN AN ORGANIZED HEALTH CARE EDUCATION/TRAINING PROGRAM

## 2022-12-02 PROCEDURE — RXMED WILLOW AMBULATORY MEDICATION CHARGE: Performed by: STUDENT IN AN ORGANIZED HEALTH CARE EDUCATION/TRAINING PROGRAM

## 2022-12-02 PROCEDURE — 700102 HCHG RX REV CODE 250 W/ 637 OVERRIDE(OP): Performed by: STUDENT IN AN ORGANIZED HEALTH CARE EDUCATION/TRAINING PROGRAM

## 2022-12-02 PROCEDURE — 700105 HCHG RX REV CODE 258: Performed by: INTERNAL MEDICINE

## 2022-12-02 PROCEDURE — 83735 ASSAY OF MAGNESIUM: CPT

## 2022-12-02 RX ORDER — POTASSIUM CHLORIDE 20 MEQ/1
40 TABLET, EXTENDED RELEASE ORAL ONCE
Status: COMPLETED | OUTPATIENT
Start: 2022-12-02 | End: 2022-12-02

## 2022-12-02 RX ORDER — PROMETHAZINE HYDROCHLORIDE 12.5 MG/1
25 TABLET ORAL EVERY 8 HOURS PRN
Qty: 15 TABLET | Refills: 0 | Status: SHIPPED | OUTPATIENT
Start: 2022-12-02 | End: 2022-12-07

## 2022-12-02 RX ADMIN — POTASSIUM CHLORIDE 40 MEQ: 1500 TABLET, EXTENDED RELEASE ORAL at 08:51

## 2022-12-02 RX ADMIN — SODIUM CHLORIDE: 9 INJECTION, SOLUTION INTRAVENOUS at 04:54

## 2022-12-02 RX ADMIN — POTASSIUM CHLORIDE 40 MEQ: 1500 TABLET, EXTENDED RELEASE ORAL at 06:15

## 2022-12-02 NOTE — PROGRESS NOTES
A&Ox4  Room air  IVF  No N/V/D this shift  No complaints of pain  Refuses bed alarm, independent to the bathroom

## 2022-12-02 NOTE — DISCHARGE INSTRUCTIONS
- Follow up with primary care physician in 1 week.   - Follow up with your GI doctor in 2 wks  - Please take the medications as instructed    - Go to the local Emergency Department if you have any worsening condition.

## 2022-12-02 NOTE — CARE PLAN
The patient is Stable - Low risk of patient condition declining or worsening    Shift Goals  Clinical Goals: ivf, labs, n/v  Patient Goals: nausea control  Family Goals: everton    Progress made toward(s) clinical / shift goals:        Problem: Pain - Standard  Goal: Alleviation of pain or a reduction in pain to the patient’s comfort goal  Outcome: Progressing     Problem: Knowledge Deficit - Standard  Goal: Patient and family/care givers will demonstrate understanding of plan of care, disease process/condition, diagnostic tests and medications  Outcome: Progressing     Problem: Fluid Volume  Goal: Fluid volume balance will be maintained  Outcome: Progressing     Problem: Gastrointestinal Irritability  Goal: Nausea and vomiting will be absent or improve  Outcome: Progressing     Problem: Gastrointestinal Irritability  Goal: Diarrhea will be absent or improved  Outcome: Progressing       Patient is not progressing towards the following goals:

## 2022-12-02 NOTE — PROGRESS NOTES
K 3.3, replaced. Patient tolerated several packs of crackers this morning. Diet advanced per protocol

## 2022-12-02 NOTE — DISCHARGE PLANNING
Meds-to-Beds: Discharge prescription order listed below delivered to patient's bedside. RN Catie notified. Patient counseled. Patient elected to have co-payment billed to patient account.        Current Outpatient Medications   Medication Sig Dispense Refill    promethazine (PHENERGAN) 12.5 MG tablet Take 2 Tablets by mouth every 8 hours as needed for Nausea/Vomiting for up to 5 days. 15 Tablet 0      Lakia Garcia, PharmD

## 2022-12-02 NOTE — DISCHARGE SUMMARY
Called patient. Left a message with info below and to call back if any questions or concern.    Discharge Summary    CHIEF COMPLAINT ON ADMISSION  Chief Complaint   Patient presents with    Abdominal Pain     Arrives with c/o generalized intermittent abd pain with n/v/d/chills x 2 days   Possibly small amount of blood in emesis  States this has happened in the past before, hx of diverticulitis   Unable to tolerate PO intake     Nausea/Vomiting/Diarrhea       Reason for Admission  Vomiting/Diarrhea     Admission Date  11/29/2022    CODE STATUS  Full Code    HPI & HOSPITAL COURSE  63 yo man with bowel resection after diverticulitis 7 yrs ago who presented with 2 days of N/V/D. CT AP showed possible terminal ileitis and bilateral adrenal nodules, hepatomegaly. He was admitted for intractable N/V and JAG.  He was placed on IV antibiotics with ceftriaxone and Flagyl.  Patient developed generalized rash with the use of IV ceftriaxone and it was discontinued.  Flagyl was also discontinued as his symptoms most likely secondary to viral infection and his white blood cell count is within normal limits.    Patient's nausea vomiting resolved with supportive care  He was able to tolerate a GI soft.    He will continue to follow-up with his GI doctor as outpatient.  His JAG also resolved with IV fluid.  He was encouraged with his dehydration.     I just got his insurance back and he recently establish care with PCP.  He was advised to to follow-up with the primary care physician in 1 week.         Therefore, he is discharged in good and stable condition to home with close outpatient follow-up.    The patient met 2-midnight criteria for an inpatient stay at the time of discharge.    Discharge Date  12/2/2022    FOLLOW UP ITEMS POST DISCHARGE  - Follow up with primary care physician in 1 week.   - Follow up with your GI doctor in 2 wks  - Please take the medications as instructed    - Go to the local Emergency Department if you have any worsening condition.       DISCHARGE DIAGNOSES  Principal Problem:    Intractable nausea  "and vomiting POA: Yes  Active Problems:    Tobacco abuse POA: Unknown    Hyponatremia POA: Yes    JAG (acute kidney injury) (HCC) POA: Yes    Hypoxia POA: Unknown  Resolved Problems:    * No resolved hospital problems. *      FOLLOW UP  - Follow up with primary care physician in 1 week.   - Follow up with your GI doctor in 2 wks    MEDICATIONS ON DISCHARGE     Medication List        START taking these medications        Instructions   promethazine 12.5 MG tablet  Commonly known as: PHENERGAN   Take 2 Tablets by mouth every 8 hours as needed for Nausea/Vomiting for up to 5 days.  Dose: 25 mg              Allergies  Allergies   Allergen Reactions    Ciprofloxacin      Pt reports \"feeling like I was on speed\"    Rocephin [Ceftriaxone] Itching       DIET  Orders Placed This Encounter   Procedures    Diet Order Diet: Level 7 - Easy to Chew; Liquid level: Level 0 - Thin     Standing Status:   Standing     Number of Occurrences:   1     Order Specific Question:   Diet:     Answer:   Level 7 - Easy to Chew [22]     Order Specific Question:   Liquid level     Answer:   Level 0 - Thin       ACTIVITY  As tolerated.  Weight bearing as tolerated    CONSULTATIONS      PROCEDURES      LABORATORY  Lab Results   Component Value Date    SODIUM 135 12/02/2022    POTASSIUM 3.3 (L) 12/02/2022    CHLORIDE 104 12/02/2022    CO2 21 12/02/2022    GLUCOSE 93 12/02/2022    BUN 11 12/02/2022    CREATININE 1.00 12/02/2022        Lab Results   Component Value Date    WBC 9.2 12/02/2022    HEMOGLOBIN 14.5 12/02/2022    HEMATOCRIT 42.3 12/02/2022    PLATELETCT 309 12/02/2022        Total time of the discharge process exceeds 32 minutes.  "

## 2023-06-28 ENCOUNTER — APPOINTMENT (OUTPATIENT)
Dept: RADIOLOGY | Facility: MEDICAL CENTER | Age: 63
DRG: 683 | End: 2023-06-28
Attending: EMERGENCY MEDICINE
Payer: COMMERCIAL

## 2023-06-28 ENCOUNTER — HOSPITAL ENCOUNTER (INPATIENT)
Facility: MEDICAL CENTER | Age: 63
LOS: 1 days | DRG: 683 | End: 2023-06-29
Attending: EMERGENCY MEDICINE | Admitting: STUDENT IN AN ORGANIZED HEALTH CARE EDUCATION/TRAINING PROGRAM
Payer: COMMERCIAL

## 2023-06-28 DIAGNOSIS — E87.20 LACTIC ACIDOSIS: ICD-10-CM

## 2023-06-28 DIAGNOSIS — R11.2 NAUSEA AND VOMITING, UNSPECIFIED VOMITING TYPE: ICD-10-CM

## 2023-06-28 DIAGNOSIS — R10.13 EPIGASTRIC PAIN: ICD-10-CM

## 2023-06-28 DIAGNOSIS — E86.0 DEHYDRATION: ICD-10-CM

## 2023-06-28 DIAGNOSIS — N17.9 ACUTE KIDNEY INJURY (HCC): ICD-10-CM

## 2023-06-28 DIAGNOSIS — D72.829 LEUKOCYTOSIS, UNSPECIFIED TYPE: ICD-10-CM

## 2023-06-28 PROBLEM — E27.8 ADRENAL NODULE (HCC): Status: ACTIVE | Noted: 2023-06-28

## 2023-06-28 PROBLEM — Z87.19 HISTORY OF DIVERTICULITIS: Status: ACTIVE | Noted: 2023-06-28

## 2023-06-28 LAB
ALBUMIN SERPL BCP-MCNC: 5.1 G/DL (ref 3.2–4.9)
ALBUMIN/GLOB SERPL: 1.7 G/DL
ALP SERPL-CCNC: 115 U/L (ref 30–99)
ALT SERPL-CCNC: 40 U/L (ref 2–50)
AMORPH CRY #/AREA URNS HPF: PRESENT /HPF
ANION GAP SERPL CALC-SCNC: 14 MMOL/L (ref 7–16)
ANION GAP SERPL CALC-SCNC: 19 MMOL/L (ref 7–16)
APPEARANCE UR: ABNORMAL
AST SERPL-CCNC: 23 U/L (ref 12–45)
B-OH-BUTYR SERPL-MCNC: 0.09 MMOL/L (ref 0.02–0.27)
BACTERIA #/AREA URNS HPF: NEGATIVE /HPF
BASOPHILS # BLD AUTO: 0.2 % (ref 0–1.8)
BASOPHILS # BLD: 0.03 K/UL (ref 0–0.12)
BILIRUB SERPL-MCNC: 0.7 MG/DL (ref 0.1–1.5)
BILIRUB UR QL STRIP.AUTO: NEGATIVE
BUN SERPL-MCNC: 18 MG/DL (ref 8–22)
BUN SERPL-MCNC: 21 MG/DL (ref 8–22)
CALCIUM ALBUM COR SERPL-MCNC: 9.6 MG/DL (ref 8.5–10.5)
CALCIUM SERPL-MCNC: 10.5 MG/DL (ref 8.5–10.5)
CALCIUM SERPL-MCNC: 8.8 MG/DL (ref 8.5–10.5)
CHLORIDE SERPL-SCNC: 104 MMOL/L (ref 96–112)
CHLORIDE SERPL-SCNC: 99 MMOL/L (ref 96–112)
CO2 SERPL-SCNC: 16 MMOL/L (ref 20–33)
CO2 SERPL-SCNC: 19 MMOL/L (ref 20–33)
COLOR UR: ABNORMAL
CREAT SERPL-MCNC: 1.36 MG/DL (ref 0.5–1.4)
CREAT SERPL-MCNC: 1.78 MG/DL (ref 0.5–1.4)
EOSINOPHIL # BLD AUTO: 0.01 K/UL (ref 0–0.51)
EOSINOPHIL NFR BLD: 0.1 % (ref 0–6.9)
EPI CELLS #/AREA URNS HPF: ABNORMAL /HPF
ERYTHROCYTE [DISTWIDTH] IN BLOOD BY AUTOMATED COUNT: 45.5 FL (ref 35.9–50)
EST. AVERAGE GLUCOSE BLD GHB EST-MCNC: 131 MG/DL
GFR SERPLBLD CREATININE-BSD FMLA CKD-EPI: 43 ML/MIN/1.73 M 2
GFR SERPLBLD CREATININE-BSD FMLA CKD-EPI: 59 ML/MIN/1.73 M 2
GLOBULIN SER CALC-MCNC: 3 G/DL (ref 1.9–3.5)
GLUCOSE BLD STRIP.AUTO-MCNC: 118 MG/DL (ref 65–99)
GLUCOSE SERPL-MCNC: 105 MG/DL (ref 65–99)
GLUCOSE SERPL-MCNC: 166 MG/DL (ref 65–99)
GLUCOSE UR STRIP.AUTO-MCNC: >=1000 MG/DL
GRAN CASTS #/AREA URNS LPF: ABNORMAL /LPF
HBA1C MFR BLD: 6.2 % (ref 4–5.6)
HCT VFR BLD AUTO: 53.2 % (ref 42–52)
HGB BLD-MCNC: 18.1 G/DL (ref 14–18)
HYALINE CASTS #/AREA URNS LPF: ABNORMAL /LPF
IMM GRANULOCYTES # BLD AUTO: 0.07 K/UL (ref 0–0.11)
IMM GRANULOCYTES NFR BLD AUTO: 0.5 % (ref 0–0.9)
KETONES UR STRIP.AUTO-MCNC: ABNORMAL MG/DL
LACTATE SERPL-SCNC: 1.6 MMOL/L (ref 0.5–2)
LACTATE SERPL-SCNC: 2.6 MMOL/L (ref 0.5–2)
LACTATE SERPL-SCNC: 2.7 MMOL/L (ref 0.5–2)
LACTATE SERPL-SCNC: 2.9 MMOL/L (ref 0.5–2)
LEUKOCYTE ESTERASE UR QL STRIP.AUTO: ABNORMAL
LIPASE SERPL-CCNC: 20 U/L (ref 11–82)
LYMPHOCYTES # BLD AUTO: 1.84 K/UL (ref 1–4.8)
LYMPHOCYTES NFR BLD: 12.5 % (ref 22–41)
MCH RBC QN AUTO: 29.3 PG (ref 27–33)
MCHC RBC AUTO-ENTMCNC: 34 G/DL (ref 32.3–36.5)
MCV RBC AUTO: 86.2 FL (ref 81.4–97.8)
MICRO URNS: ABNORMAL
MONOCYTES # BLD AUTO: 0.82 K/UL (ref 0–0.85)
MONOCYTES NFR BLD AUTO: 5.6 % (ref 0–13.4)
NEUTROPHILS # BLD AUTO: 11.92 K/UL (ref 1.82–7.42)
NEUTROPHILS NFR BLD: 81.1 % (ref 44–72)
NITRITE UR QL STRIP.AUTO: NEGATIVE
NRBC # BLD AUTO: 0 K/UL
NRBC BLD-RTO: 0 /100 WBC (ref 0–0.2)
PH UR STRIP.AUTO: 5 [PH] (ref 5–8)
PLATELET # BLD AUTO: 413 K/UL (ref 164–446)
PMV BLD AUTO: 10.7 FL (ref 9–12.9)
POTASSIUM SERPL-SCNC: 4.4 MMOL/L (ref 3.6–5.5)
POTASSIUM SERPL-SCNC: 4.5 MMOL/L (ref 3.6–5.5)
PROT SERPL-MCNC: 8.1 G/DL (ref 6–8.2)
PROT UR QL STRIP: 300 MG/DL
RBC # BLD AUTO: 6.17 M/UL (ref 4.7–6.1)
RBC # URNS HPF: ABNORMAL /HPF
RBC UR QL AUTO: NEGATIVE
SODIUM SERPL-SCNC: 134 MMOL/L (ref 135–145)
SODIUM SERPL-SCNC: 137 MMOL/L (ref 135–145)
SP GR UR STRIP.AUTO: 1.04
TROPONIN T SERPL-MCNC: 11 NG/L (ref 6–19)
UROBILINOGEN UR STRIP.AUTO-MCNC: 1 MG/DL
WBC # BLD AUTO: 14.7 K/UL (ref 4.8–10.8)
WBC #/AREA URNS HPF: ABNORMAL /HPF

## 2023-06-28 PROCEDURE — 700105 HCHG RX REV CODE 258: Mod: JZ

## 2023-06-28 PROCEDURE — A9270 NON-COVERED ITEM OR SERVICE: HCPCS | Performed by: EMERGENCY MEDICINE

## 2023-06-28 PROCEDURE — 700102 HCHG RX REV CODE 250 W/ 637 OVERRIDE(OP): Performed by: EMERGENCY MEDICINE

## 2023-06-28 PROCEDURE — 82962 GLUCOSE BLOOD TEST: CPT

## 2023-06-28 PROCEDURE — 700102 HCHG RX REV CODE 250 W/ 637 OVERRIDE(OP)

## 2023-06-28 PROCEDURE — 96375 TX/PRO/DX INJ NEW DRUG ADDON: CPT

## 2023-06-28 PROCEDURE — 770006 HCHG ROOM/CARE - MED/SURG/GYN SEMI*

## 2023-06-28 PROCEDURE — 83690 ASSAY OF LIPASE: CPT

## 2023-06-28 PROCEDURE — 93005 ELECTROCARDIOGRAM TRACING: CPT | Performed by: EMERGENCY MEDICINE

## 2023-06-28 PROCEDURE — 700111 HCHG RX REV CODE 636 W/ 250 OVERRIDE (IP): Performed by: EMERGENCY MEDICINE

## 2023-06-28 PROCEDURE — 84484 ASSAY OF TROPONIN QUANT: CPT

## 2023-06-28 PROCEDURE — 74177 CT ABD & PELVIS W/CONTRAST: CPT

## 2023-06-28 PROCEDURE — A9270 NON-COVERED ITEM OR SERVICE: HCPCS

## 2023-06-28 PROCEDURE — 80048 BASIC METABOLIC PNL TOTAL CA: CPT

## 2023-06-28 PROCEDURE — 85025 COMPLETE CBC W/AUTO DIFF WBC: CPT

## 2023-06-28 PROCEDURE — 82010 KETONE BODYS QUAN: CPT

## 2023-06-28 PROCEDURE — 81001 URINALYSIS AUTO W/SCOPE: CPT

## 2023-06-28 PROCEDURE — 80053 COMPREHEN METABOLIC PANEL: CPT

## 2023-06-28 PROCEDURE — 36415 COLL VENOUS BLD VENIPUNCTURE: CPT

## 2023-06-28 PROCEDURE — 96374 THER/PROPH/DIAG INJ IV PUSH: CPT

## 2023-06-28 PROCEDURE — 99285 EMERGENCY DEPT VISIT HI MDM: CPT

## 2023-06-28 PROCEDURE — 99223 1ST HOSP IP/OBS HIGH 75: CPT | Mod: AI,GC | Performed by: STUDENT IN AN ORGANIZED HEALTH CARE EDUCATION/TRAINING PROGRAM

## 2023-06-28 PROCEDURE — 700105 HCHG RX REV CODE 258: Mod: JZ | Performed by: EMERGENCY MEDICINE

## 2023-06-28 PROCEDURE — 83036 HEMOGLOBIN GLYCOSYLATED A1C: CPT

## 2023-06-28 PROCEDURE — 700117 HCHG RX CONTRAST REV CODE 255: Performed by: EMERGENCY MEDICINE

## 2023-06-28 PROCEDURE — 83605 ASSAY OF LACTIC ACID: CPT

## 2023-06-28 RX ORDER — ACETAMINOPHEN 325 MG/1
650 TABLET ORAL EVERY 6 HOURS PRN
Status: DISCONTINUED | OUTPATIENT
Start: 2023-06-28 | End: 2023-06-29 | Stop reason: HOSPADM

## 2023-06-28 RX ORDER — ONDANSETRON 2 MG/ML
4 INJECTION INTRAMUSCULAR; INTRAVENOUS EVERY 4 HOURS PRN
Status: DISCONTINUED | OUTPATIENT
Start: 2023-06-28 | End: 2023-06-29 | Stop reason: HOSPADM

## 2023-06-28 RX ORDER — SODIUM CHLORIDE, SODIUM LACTATE, POTASSIUM CHLORIDE, AND CALCIUM CHLORIDE .6; .31; .03; .02 G/100ML; G/100ML; G/100ML; G/100ML
30 INJECTION, SOLUTION INTRAVENOUS ONCE
Status: COMPLETED | OUTPATIENT
Start: 2023-06-28 | End: 2023-06-28

## 2023-06-28 RX ORDER — ONDANSETRON 2 MG/ML
4 INJECTION INTRAMUSCULAR; INTRAVENOUS ONCE
Status: COMPLETED | OUTPATIENT
Start: 2023-06-28 | End: 2023-06-28

## 2023-06-28 RX ORDER — OMEPRAZOLE 20 MG/1
20 CAPSULE, DELAYED RELEASE ORAL EVERY MORNING
Status: DISCONTINUED | OUTPATIENT
Start: 2023-06-29 | End: 2023-06-29 | Stop reason: HOSPADM

## 2023-06-28 RX ORDER — OMEPRAZOLE 20 MG/1
20 CAPSULE, DELAYED RELEASE ORAL EVERY MORNING
COMMUNITY

## 2023-06-28 RX ORDER — AMOXICILLIN 250 MG
2 CAPSULE ORAL 2 TIMES DAILY
Status: DISCONTINUED | OUTPATIENT
Start: 2023-06-28 | End: 2023-06-29 | Stop reason: HOSPADM

## 2023-06-28 RX ORDER — ROSUVASTATIN CALCIUM 10 MG/1
10 TABLET, COATED ORAL
COMMUNITY

## 2023-06-28 RX ORDER — POLYETHYLENE GLYCOL 3350 17 G/17G
1 POWDER, FOR SOLUTION ORAL
Status: DISCONTINUED | OUTPATIENT
Start: 2023-06-28 | End: 2023-06-29 | Stop reason: HOSPADM

## 2023-06-28 RX ORDER — ONDANSETRON 4 MG/1
4 TABLET, ORALLY DISINTEGRATING ORAL EVERY 4 HOURS PRN
Status: DISCONTINUED | OUTPATIENT
Start: 2023-06-28 | End: 2023-06-29 | Stop reason: HOSPADM

## 2023-06-28 RX ORDER — MORPHINE SULFATE 4 MG/ML
4 INJECTION INTRAVENOUS ONCE
Status: COMPLETED | OUTPATIENT
Start: 2023-06-28 | End: 2023-06-28

## 2023-06-28 RX ORDER — SODIUM CHLORIDE, SODIUM LACTATE, POTASSIUM CHLORIDE, CALCIUM CHLORIDE 600; 310; 30; 20 MG/100ML; MG/100ML; MG/100ML; MG/100ML
INJECTION, SOLUTION INTRAVENOUS CONTINUOUS
Status: DISCONTINUED | OUTPATIENT
Start: 2023-06-28 | End: 2023-06-28

## 2023-06-28 RX ORDER — TAMSULOSIN HYDROCHLORIDE 0.4 MG/1
0.4 CAPSULE ORAL
Status: DISCONTINUED | OUTPATIENT
Start: 2023-06-29 | End: 2023-06-29 | Stop reason: HOSPADM

## 2023-06-28 RX ORDER — PROMETHAZINE HYDROCHLORIDE 25 MG/1
12.5-25 TABLET ORAL EVERY 4 HOURS PRN
Status: DISCONTINUED | OUTPATIENT
Start: 2023-06-28 | End: 2023-06-29 | Stop reason: HOSPADM

## 2023-06-28 RX ORDER — ERGOCALCIFEROL 1.25 MG/1
50000 CAPSULE ORAL
COMMUNITY

## 2023-06-28 RX ORDER — DAPAGLIFLOZIN 10 MG/1
10 TABLET, FILM COATED ORAL
Status: ON HOLD | COMMUNITY
End: 2023-06-29

## 2023-06-28 RX ORDER — PROMETHAZINE HYDROCHLORIDE 12.5 MG/1
25 SUPPOSITORY RECTAL EVERY 8 HOURS PRN
COMMUNITY

## 2023-06-28 RX ORDER — BISACODYL 10 MG
10 SUPPOSITORY, RECTAL RECTAL
Status: DISCONTINUED | OUTPATIENT
Start: 2023-06-28 | End: 2023-06-29 | Stop reason: HOSPADM

## 2023-06-28 RX ORDER — ROSUVASTATIN CALCIUM 20 MG/1
10 TABLET, COATED ORAL
Status: DISCONTINUED | OUTPATIENT
Start: 2023-06-28 | End: 2023-06-29 | Stop reason: HOSPADM

## 2023-06-28 RX ORDER — TAMSULOSIN HYDROCHLORIDE 0.4 MG/1
0.4 CAPSULE ORAL
COMMUNITY

## 2023-06-28 RX ORDER — SODIUM CHLORIDE, SODIUM LACTATE, POTASSIUM CHLORIDE, CALCIUM CHLORIDE 600; 310; 30; 20 MG/100ML; MG/100ML; MG/100ML; MG/100ML
1000 INJECTION, SOLUTION INTRAVENOUS ONCE
Status: COMPLETED | OUTPATIENT
Start: 2023-06-28 | End: 2023-06-28

## 2023-06-28 RX ORDER — PROMETHAZINE HYDROCHLORIDE 12.5 MG/1
12.5-25 SUPPOSITORY RECTAL EVERY 4 HOURS PRN
Status: DISCONTINUED | OUTPATIENT
Start: 2023-06-28 | End: 2023-06-29 | Stop reason: HOSPADM

## 2023-06-28 RX ORDER — PROCHLORPERAZINE EDISYLATE 5 MG/ML
5-10 INJECTION INTRAMUSCULAR; INTRAVENOUS EVERY 4 HOURS PRN
Status: DISCONTINUED | OUTPATIENT
Start: 2023-06-28 | End: 2023-06-29 | Stop reason: HOSPADM

## 2023-06-28 RX ADMIN — IOHEXOL 100 ML: 350 INJECTION, SOLUTION INTRAVENOUS at 19:00

## 2023-06-28 RX ADMIN — SODIUM CHLORIDE, POTASSIUM CHLORIDE, SODIUM LACTATE AND CALCIUM CHLORIDE 1000 ML: 600; 310; 30; 20 INJECTION, SOLUTION INTRAVENOUS at 16:34

## 2023-06-28 RX ADMIN — SODIUM CHLORIDE, POTASSIUM CHLORIDE, SODIUM LACTATE AND CALCIUM CHLORIDE 2313 ML: 600; 310; 30; 20 INJECTION, SOLUTION INTRAVENOUS at 18:44

## 2023-06-28 RX ADMIN — FAMOTIDINE 20 MG: 10 INJECTION INTRAVENOUS at 19:09

## 2023-06-28 RX ADMIN — LIDOCAINE HYDROCHLORIDE 30 ML: 20 SOLUTION OROPHARYNGEAL at 19:09

## 2023-06-28 RX ADMIN — SODIUM CHLORIDE, POTASSIUM CHLORIDE, SODIUM LACTATE AND CALCIUM CHLORIDE: 600; 310; 30; 20 INJECTION, SOLUTION INTRAVENOUS at 22:58

## 2023-06-28 RX ADMIN — RIVAROXABAN 10 MG: 10 TABLET, FILM COATED ORAL at 21:23

## 2023-06-28 RX ADMIN — ROSUVASTATIN CALCIUM 10 MG: 20 TABLET, FILM COATED ORAL at 22:56

## 2023-06-28 RX ADMIN — ONDANSETRON 4 MG: 2 INJECTION INTRAMUSCULAR; INTRAVENOUS at 16:34

## 2023-06-28 RX ADMIN — MORPHINE SULFATE 4 MG: 4 INJECTION INTRAVENOUS at 16:34

## 2023-06-28 ASSESSMENT — FIBROSIS 4 INDEX
FIB4 SCORE: 0.8
FIB4 SCORE: 0.53

## 2023-06-28 ASSESSMENT — PAIN DESCRIPTION - PAIN TYPE
TYPE: ACUTE PAIN
TYPE: ACUTE PAIN

## 2023-06-28 NOTE — ED PROVIDER NOTES
"  ER Provider Note    Scribed for Lauren Hester M.d. by Komal Dao. 6/28/2023  3:22 PM    Primary Care Provider: Pcp Pt States None    CHIEF COMPLAINT  Chief Complaint   Patient presents with    Abdominal Pain     Since Sunday. History of bowel obstruction 10 years prior. Reports to passing flatus.     N/V     X 3 days.      LIMITATION TO HISTORY   Select: : None    HPI/ROS  OUTSIDE HISTORIAN(S):  None    EXTERNAL RECORDS REVIEWED  Inpatient Notes Patient was admitted November for 3 days with terminal ileitis. He was admitted for intractable nausea and vomiting with AJG. He did develop a rash with Rocephin. The JAG resolved with fluids.      Gustavo Prieto is a 62 y.o. male who presents to the ED for evaluation of nausea and vomiting onset Monday. Patient started feeling nauseous Monday morning. He then had a few episodes of emesis. Following this, patient developed abdominal pain. The pain does not radiate. This type of episode has happened several times to Gustavo. The last time was in December 2022. His CT scan showed inflammation in his GI region at that time. He had a GI appointment for tomorrow, but canceled it because he was feeling poorly today and came here to ER. Typically, he will first became nauseous. Then he will have vomiting which will result in dehydration. Following this, patient will develop abdominal pain. Patient is on medication for acid reflux. He takes the medication as needed. He admits to associated symptoms of diarrhea, but denies hematemesis, chest pain, or cough. No alleviating factors were reported.  Patient has a history of diverticulitis surgery and was on an ostomy bag. He has a history of \"borderline\" bowel obstruction. Patient smoked for 45 years, and he quit last year.  Patient just started Farxiga 1 month ago.      PAST MEDICAL HISTORY  Past Medical History:   Diagnosis Date    S/P colostomy (HCC) 1/2014       SURGICAL HISTORY  Past Surgical History:   Procedure Laterality " "Date    COLOSTOMY TAKEDOWN  6/9/2014    Performed by Leopoldo Shrestha M.D. at SURGERY [facility] ORS    EXPLORATORY LAPAROTOMY  1/15/2014    Performed by Leopoldo Shrestha M.D. at SURGERY [facility] ORS    COLON RESECTION  1/15/2014    Performed by Leopoldo Shrestha M.D. at SURGERY [facility]       FAMILY HISTORY  Family History   Problem Relation Age of Onset    Diabetes Father     Heart Attack Father        SOCIAL HISTORY   reports that he quit smoking about 9 years ago. His smoking use included cigarettes. He started smoking about 45 years ago. He has a 17.50 pack-year smoking history. He has never used smokeless tobacco. He reports current alcohol use. He reports current drug use.    CURRENT MEDICATIONS  None noted.     ALLERGIES  Ciprofloxacin and Rocephin [ceftriaxone]    PHYSICAL EXAM  BP (!) 160/112   Pulse 90   Temp 36.8 °C (98.2 °F) (Temporal)   Resp 18   Ht 1.753 m (5' 9\")   Wt 77.1 kg (170 lb)   SpO2 97%   BMI 25.10 kg/m²     Constitutional: Appears uncomfortable. Well developed, well nourished;   HENT: Normocephalic, Atraumatic, Bilateral external ears normal, dry mucous membranes,  Eyes: PERRL, EOMI, Conjunctiva normal, No discharge.   Neck: Normal range of motion, supple, nontender  Lymphatic: No lymphadenopathy noted.   Cardiovascular: Normal heart rate, Normal rhythm, No murmurs, rubs or gallops   Thorax & Lungs:   Expiratory wheezes, No respiratory distress,  No rales, or rhonchi; No chest tenderness. No crepitus or subQ air  Abdomen: Soft, bowel sounds slightly diminished but present, no guarding no rebound, no masses, no pulsatile mass, no tenderness, no distention  Skin: Healed midline surgical scar to abdomen, Warm, Dry, No erythema, No rash.   Back: No tenderness, No CVA tenderness.   Extremities: 2+ dp and pt pulses bilateral LEs;  Nontender; no pretibial edema  Neurologic: Alert & oriented x 4, clear speech  Psychiatric: appropriate, normal affect      DIAGNOSTIC STUDIES & " PROCEDURES    Labs:   Results for orders placed or performed during the hospital encounter of 06/28/23   CBC WITH DIFFERENTIAL   Result Value Ref Range    WBC 14.7 (H) 4.8 - 10.8 K/uL    RBC 6.17 (H) 4.70 - 6.10 M/uL    Hemoglobin 18.1 (H) 14.0 - 18.0 g/dL    Hematocrit 53.2 (H) 42.0 - 52.0 %    MCV 86.2 81.4 - 97.8 fL    MCH 29.3 27.0 - 33.0 pg    MCHC 34.0 32.3 - 36.5 g/dL    RDW 45.5 35.9 - 50.0 fL    Platelet Count 413 164 - 446 K/uL    MPV 10.7 9.0 - 12.9 fL    Neutrophils-Polys 81.10 (H) 44.00 - 72.00 %    Lymphocytes 12.50 (L) 22.00 - 41.00 %    Monocytes 5.60 0.00 - 13.40 %    Eosinophils 0.10 0.00 - 6.90 %    Basophils 0.20 0.00 - 1.80 %    Immature Granulocytes 0.50 0.00 - 0.90 %    Nucleated RBC 0.00 0.00 - 0.20 /100 WBC    Neutrophils (Absolute) 11.92 (H) 1.82 - 7.42 K/uL    Lymphs (Absolute) 1.84 1.00 - 4.80 K/uL    Monos (Absolute) 0.82 0.00 - 0.85 K/uL    Eos (Absolute) 0.01 0.00 - 0.51 K/uL    Baso (Absolute) 0.03 0.00 - 0.12 K/uL    Immature Granulocytes (abs) 0.07 0.00 - 0.11 K/uL    NRBC (Absolute) 0.00 K/uL   COMP METABOLIC PANEL   Result Value Ref Range    Sodium 134 (L) 135 - 145 mmol/L    Potassium 4.5 3.6 - 5.5 mmol/L    Chloride 99 96 - 112 mmol/L    Co2 16 (L) 20 - 33 mmol/L    Anion Gap 19.0 (H) 7.0 - 16.0    Glucose 166 (H) 65 - 99 mg/dL    Bun 21 8 - 22 mg/dL    Creatinine 1.78 (H) 0.50 - 1.40 mg/dL    Calcium 10.5 8.5 - 10.5 mg/dL    AST(SGOT) 23 12 - 45 U/L    ALT(SGPT) 40 2 - 50 U/L    Alkaline Phosphatase 115 (H) 30 - 99 U/L    Total Bilirubin 0.7 0.1 - 1.5 mg/dL    Albumin 5.1 (H) 3.2 - 4.9 g/dL    Total Protein 8.1 6.0 - 8.2 g/dL    Globulin 3.0 1.9 - 3.5 g/dL    A-G Ratio 1.7 g/dL   LIPASE   Result Value Ref Range    Lipase 20 11 - 82 U/L   URINALYSIS    Specimen: Urine   Result Value Ref Range    Color DK Yellow     Character Cloudy (A)     Specific Gravity 1.039 <1.035    Ph 5.0 5.0 - 8.0    Glucose >=1000 (A) Negative mg/dL    Ketones Trace (A) Negative mg/dL    Protein 300 (A)  Negative mg/dL    Bilirubin Negative Negative    Urobilinogen, Urine 1.0 Negative    Nitrite Negative Negative    Leukocyte Esterase Trace (A) Negative    Occult Blood Negative Negative    Micro Urine Req Microscopic    ESTIMATED GFR   Result Value Ref Range    GFR (CKD-EPI) 43 (A) >60 mL/min/1.73 m 2   CORRECTED CALCIUM   Result Value Ref Range    Correct Calcium 9.6 8.5 - 10.5 mg/dL   URINE MICROSCOPIC (W/UA)   Result Value Ref Range    WBC 2-5 (A) /hpf    RBC 0-2 (A) /hpf    Bacteria Negative None /hpf    Epithelial Cells Few /hpf    Amorphous Crystal Present /hpf    Hyaline Cast 6-10 (A) /lpf    Granular Casts 0-2 /lpf   Lactic Acid   Result Value Ref Range    Lactic Acid 2.7 (H) 0.5 - 2.0 mmol/L   HEMOGLOBIN A1C   Result Value Ref Range    Glycohemoglobin 6.2 (H) 4.0 - 5.6 %    Est Avg Glucose 131 mg/dL      All labs reviewed by me.    Radiology:   The attending Emergency Physician has independently interpreted the diagnostic imaging associated with this visit and is awaiting the final reading from the radiologist, which will be displayed below.    Preliminary independent interpretation is a follows: I reviewed the patient's CT scan.  No obvious obstruction.  Question some stranding around the right lower quadrant.    Radiologist interpretation:     CT-ABDOMEN-PELVIS WITH   Final Result      1.  Hepatic steatosis.   2.  Bilateral adrenal nodules presumably represent adrenal adenomas.   3.  No acute abnormality.           COURSE & MEDICAL DECISION MAKING    ED Observation Status? Yes; I am placing the patient in to an observation status due to a diagnostic uncertainty as well as therapeutic intensity. Patient placed in observation status at 4:24 PM, 6/28/2023.     Observation plan is as follows: Patient was placed in ED observation status as he will need comprehensive work-up and evaluation from his complaint of abdominal pain with nausea and vomiting and dehydration.    Upon Reevaluation, the patient's  condition has: not improved; and will be escalated to hospitalization.    Patient discharged from ED Observation status at 7:14 PM (Time) 6/28/2023 (Date).     INITIAL ASSESSMENT AND PLAN  Care Narrative: Patient presents to the ER complaining of nausea and vomiting as well as abdominal pain for the last 3 days.  He has history of similar pain in the past, several times, but the last episode being in December 2022.  At that time he was diagnosed with a terminal ileitis.  Patient reports that the symptoms are identical to previous episodes.  He began having nausea and vomiting 3 days ago.  The abdominal pain started shortly thereafter.  He describes it as a burning type of pain.  Here in the ER he says he is feeling better from a pain standpoint.  He is mostly just feeling nauseated at this time.  He was given some nausea medication in the ambulance which seemed to help a bit.  Patient did not have any tenderness on my examination.  However, he localizes pain to the mid abdomen and just above the umbilicus.  Patient's labs reveal some dehydration with acute kidney injury.  He does have a mild anion gap acidosis.  Initially I was not aware that the patient was on Farxiga.  Right around the time I was preparing the patient for admission, I found that he had been taking Farxiga for about a month.  At this time I considered the possibility of a euglycemic DKA.  Beta hydroxybutyric acid was added to the blood work and lab and it is negative.  At this time, [facility] internal medicine plans on admitting the patient to the floor as he will not need the unit as he will not need a insulin drip since his beta hydroxybutyric acid is normal.  With respect to his CT scan\today, no terminal ileitis identified on today's CAT scan.  No evidence of appendicitis, colitis, or diverticulitis.  No obstruction or perforation.  Patient's white count is a little high.  This could be hemoconcentration.  However, it also could be a harbinger of  "infection since his lactic acid level came back a little high at 2.7.  He was hydrated and repeat lactic acid level is 2.6.  He has not had fevers or chills.  He is not tachycardic or hypotensive.  At this time he does not appear to be septic or toxic.  Patient will need to be hospitalized for dehydration as well as anion gap acidosis and lactic acidosis with acute kidney injury.  I spoke with [facility] internal medicine residents on-call and they will kindly evaluate the patient hospitalization.      3:22 PM - Patient seen and evaluated at bedside. Gustavo Prieto is a 62 y.o. male who presents with nausea and vomiting. Patient will be treated with LR infusion, morphine 4 mg injection, and Zofran 4 mg injection for his symptoms. Ordered labs and scans to evaluate. He understands and agrees to the plan of care.     6:40 PM - Patient will be treated with lactated ringers.     6:53 PM - Patient was reevaluated at bedside. Patient is not needing pain or nausea medication. He is having some acid reflux. Will give me a GI Cocktail. Informed him that we are waiting on radiology read. We do not see an obstruction. Will plan for admission because his white count is high and he is dehydrated. Patient verbalizes understanding and agreement to this plan of care.  Will treat patient with GI Cocktail and Pepcid injection 20 mg for acid reflux.     6:59 PM - Paged Hospitalist.     7:13 PM - I discussed the patient's case and the above findings with [facility] internal medicine who kindly agreed to evaluate the patient.     1930: Med list have not been updated upon admission.  I went back to review patient's medications.  He is on Farxiga.  He has been on Farxiga for 1 month for \"prediabetes.\"  At this time we must consider a euglycemic DKA.  A beta hydroxybutyric acid has been added to patient's blood work.  I spoke with the [facility] internal medicine resident, Dr. Westbrook, and asked that we hold off on admitting " him to the floor at this time.  At the bed hydroxybutyric acid comes back high, the patient will need to go to unit for insulin drip.    1950: Beta hydroxybutyric acid is normal.  [facility] we will go ahead and admit to the floor as he likely has a mild anion gap acidosis from vomiting and not from euglycemic DKA.    HYDRATION: Based on the patient's presentation of Other dehydration and high lactic acid the patient was given IV fluids. IV Hydration was used because oral hydration was not adequate alone. Upon recheck following hydration, the patient was mildly improved.    ADDITIONAL PROBLEM LIST AND DISPOSITION  Problem #1: Nausea and vomiting  Problem #2: Abdominal pain               DISPOSITION AND DISCUSSIONS  I have discussed management of the patient with the following physicians and RADHA's: [facility] internal medicine    Dicussion of management with other Q or appropriate source(s): Pharmacy spoke   to uziel Mendoza.  He said Farxiga can cause euglycemic DKA.    Escalation of care considered, and ultimately not performed: diagnostic imaging.  Patient has no complaint of chest pain, cough or shortness of breath.  At this time no need for chest x-ray.    Barriers to care at this time, including but not limited to: None known    Decision tools and prescription drugs considered including, but not limited to: Antibiotics at this time no evidence of colitis, diverticulitis, appendicitis, or any other condition which would require antibiotics. .    DISPOSITION:  Patient will be hospitalized by [facility] family in guarded condition.      FINAL IMPRESSION   1. Nausea and vomiting, unspecified vomiting type Acute   2. Lactic acidosis Acute   3. Epigastric pain Acute   4. Acute kidney injury (HCC) Acute   5. Dehydration Acute   6. Leukocytosis, unspecified type Acute     This dictation has been created using voice recognition software. The accuracy of the dictation is limited by the abilities of the software. I expect there may be  some errors of grammar and possibly content. I made every attempt to manually correct the errors within my dictation. However, errors related to voice recognition software may still exist and should be interpreted within the appropriate context.      I, Komal Dao (Scribe), am scribing for, and in the presence of, Lauren Hester M.D..    Electronically signed by: Komal Dao (Cassandraibjose a), 6/28/2023    Lauren STILL M.D. personally performed the services described in this documentation, as scribed by Komal Dao in my presence, and it is both accurate and complete.    The note accurately reflects work and decisions made by me.  Lauren Hester M.D.  6/28/2023  7:40 PM

## 2023-06-28 NOTE — ED TRIAGE NOTES
"Gustavo Prieto  62 y.o.    Chief Complaint   Patient presents with    Abdominal Pain     Since Sunday. History of bowel obstruction 10 years prior. Reports to passing flatus.     N/V     X 3 days.        BP (!) 139/106   Pulse 93   Temp 36.8 °C (98.2 °F) (Temporal)   Resp 16   Ht 1.753 m (5' 9\")   Wt 77.1 kg (170 lb)   SpO2 100%   BMI 25.10 kg/m²     Protocol placed, pt educated to alert staff with any changes or concerns.   "

## 2023-06-29 VITALS
BODY MASS INDEX: 25.31 KG/M2 | HEART RATE: 60 BPM | SYSTOLIC BLOOD PRESSURE: 140 MMHG | RESPIRATION RATE: 17 BRPM | OXYGEN SATURATION: 92 % | DIASTOLIC BLOOD PRESSURE: 86 MMHG | HEIGHT: 69 IN | WEIGHT: 170.86 LBS | TEMPERATURE: 97.7 F

## 2023-06-29 LAB
ALBUMIN SERPL BCP-MCNC: 4 G/DL (ref 3.2–4.9)
ALBUMIN/GLOB SERPL: 2 G/DL
ALP SERPL-CCNC: 86 U/L (ref 30–99)
ALT SERPL-CCNC: 31 U/L (ref 2–50)
ANION GAP SERPL CALC-SCNC: 11 MMOL/L (ref 7–16)
AST SERPL-CCNC: 20 U/L (ref 12–45)
BASOPHILS # BLD AUTO: 0.4 % (ref 0–1.8)
BASOPHILS # BLD: 0.05 K/UL (ref 0–0.12)
BILIRUB SERPL-MCNC: 0.8 MG/DL (ref 0.1–1.5)
BUN SERPL-MCNC: 17 MG/DL (ref 8–22)
CALCIUM ALBUM COR SERPL-MCNC: 8.9 MG/DL (ref 8.5–10.5)
CALCIUM SERPL-MCNC: 8.9 MG/DL (ref 8.5–10.5)
CHLORIDE SERPL-SCNC: 104 MMOL/L (ref 96–112)
CO2 SERPL-SCNC: 22 MMOL/L (ref 20–33)
CREAT SERPL-MCNC: 1.18 MG/DL (ref 0.5–1.4)
EKG IMPRESSION: NORMAL
EOSINOPHIL # BLD AUTO: 0.2 K/UL (ref 0–0.51)
EOSINOPHIL NFR BLD: 1.5 % (ref 0–6.9)
ERYTHROCYTE [DISTWIDTH] IN BLOOD BY AUTOMATED COUNT: 46.4 FL (ref 35.9–50)
GFR SERPLBLD CREATININE-BSD FMLA CKD-EPI: 70 ML/MIN/1.73 M 2
GLOBULIN SER CALC-MCNC: 2 G/DL (ref 1.9–3.5)
GLUCOSE BLD STRIP.AUTO-MCNC: 93 MG/DL (ref 65–99)
GLUCOSE SERPL-MCNC: 102 MG/DL (ref 65–99)
HCT VFR BLD AUTO: 44.8 % (ref 42–52)
HGB BLD-MCNC: 15.2 G/DL (ref 14–18)
IMM GRANULOCYTES # BLD AUTO: 0.07 K/UL (ref 0–0.11)
IMM GRANULOCYTES NFR BLD AUTO: 0.5 % (ref 0–0.9)
LACTATE SERPL-SCNC: 1.1 MMOL/L (ref 0.5–2)
LACTATE SERPL-SCNC: 1.1 MMOL/L (ref 0.5–2)
LACTATE SERPL-SCNC: 1.3 MMOL/L (ref 0.5–2)
LYMPHOCYTES # BLD AUTO: 4.15 K/UL (ref 1–4.8)
LYMPHOCYTES NFR BLD: 31 % (ref 22–41)
MAGNESIUM SERPL-MCNC: 2 MG/DL (ref 1.5–2.5)
MCH RBC QN AUTO: 29.7 PG (ref 27–33)
MCHC RBC AUTO-ENTMCNC: 33.9 G/DL (ref 32.3–36.5)
MCV RBC AUTO: 87.7 FL (ref 81.4–97.8)
MONOCYTES # BLD AUTO: 1.39 K/UL (ref 0–0.85)
MONOCYTES NFR BLD AUTO: 10.4 % (ref 0–13.4)
NEUTROPHILS # BLD AUTO: 7.53 K/UL (ref 1.82–7.42)
NEUTROPHILS NFR BLD: 56.2 % (ref 44–72)
NRBC # BLD AUTO: 0 K/UL
NRBC BLD-RTO: 0 /100 WBC (ref 0–0.2)
PHOSPHATE SERPL-MCNC: 2.8 MG/DL (ref 2.5–4.5)
PLATELET # BLD AUTO: 313 K/UL (ref 164–446)
PMV BLD AUTO: 10.1 FL (ref 9–12.9)
POTASSIUM SERPL-SCNC: 4 MMOL/L (ref 3.6–5.5)
PROT SERPL-MCNC: 6 G/DL (ref 6–8.2)
RBC # BLD AUTO: 5.11 M/UL (ref 4.7–6.1)
SODIUM SERPL-SCNC: 137 MMOL/L (ref 135–145)
WBC # BLD AUTO: 13.4 K/UL (ref 4.8–10.8)

## 2023-06-29 PROCEDURE — 80053 COMPREHEN METABOLIC PANEL: CPT

## 2023-06-29 PROCEDURE — 85025 COMPLETE CBC W/AUTO DIFF WBC: CPT

## 2023-06-29 PROCEDURE — 83605 ASSAY OF LACTIC ACID: CPT

## 2023-06-29 PROCEDURE — A9270 NON-COVERED ITEM OR SERVICE: HCPCS

## 2023-06-29 PROCEDURE — 99239 HOSP IP/OBS DSCHRG MGMT >30: CPT | Performed by: INTERNAL MEDICINE

## 2023-06-29 PROCEDURE — 82962 GLUCOSE BLOOD TEST: CPT

## 2023-06-29 PROCEDURE — 84100 ASSAY OF PHOSPHORUS: CPT

## 2023-06-29 PROCEDURE — 83735 ASSAY OF MAGNESIUM: CPT

## 2023-06-29 PROCEDURE — 700102 HCHG RX REV CODE 250 W/ 637 OVERRIDE(OP)

## 2023-06-29 PROCEDURE — 93010 ELECTROCARDIOGRAM REPORT: CPT | Performed by: INTERNAL MEDICINE

## 2023-06-29 RX ADMIN — OMEPRAZOLE 20 MG: 20 CAPSULE, DELAYED RELEASE ORAL at 05:15

## 2023-06-29 RX ADMIN — TAMSULOSIN HYDROCHLORIDE 0.4 MG: 0.4 CAPSULE ORAL at 11:56

## 2023-06-29 ASSESSMENT — LIFESTYLE VARIABLES
ON A TYPICAL DAY WHEN YOU DRINK ALCOHOL HOW MANY DRINKS DO YOU HAVE: 1
EVER HAD A DRINK FIRST THING IN THE MORNING TO STEADY YOUR NERVES TO GET RID OF A HANGOVER: NO
HOW MANY TIMES IN THE PAST YEAR HAVE YOU HAD 5 OR MORE DRINKS IN A DAY: 0
ALCOHOL_USE: YES
AVERAGE NUMBER OF DAYS PER WEEK YOU HAVE A DRINK CONTAINING ALCOHOL: 0
HAVE YOU EVER FELT YOU SHOULD CUT DOWN ON YOUR DRINKING: NO
DOES PATIENT WANT TO STOP DRINKING: NO
TOTAL SCORE: 0
SUBSTANCE_ABUSE: 1
CONSUMPTION TOTAL: NEGATIVE
EVER FELT BAD OR GUILTY ABOUT YOUR DRINKING: NO
HAVE PEOPLE ANNOYED YOU BY CRITICIZING YOUR DRINKING: NO
TOTAL SCORE: 0
TOTAL SCORE: 0

## 2023-06-29 ASSESSMENT — ENCOUNTER SYMPTOMS
DIZZINESS: 0
NAUSEA: 1
DIARRHEA: 0
FEVER: 0
ABDOMINAL PAIN: 0
FLANK PAIN: 0
WEAKNESS: 0
VOMITING: 1
CHILLS: 0

## 2023-06-29 NOTE — ASSESSMENT & PLAN NOTE
Secondary to vomiting for past 3 days  Status post 3 L IV fluids in the ED  Hemodynamically stable    - Given persistent elevated lactate we will keep on maintenance IV fluids until able to tolerate p.o.

## 2023-06-29 NOTE — ASSESSMENT & PLAN NOTE
History of diverticulitis complicated by perforation requiring colostomy currently has had colostomy takedown.  Clinical presentation not concerning for acute colitis picture at this time.    - Continue to monitor  - Consider GI consult if patient develops worsening GI symptoms.

## 2023-06-29 NOTE — DISCHARGE INSTRUCTIONS
Acute Kidney Injury, Adult  Acute kidney injury is a sudden worsening of kidney function. The kidneys are a pair of organs that do many important jobs in the body, including:  Make urine.  Make hormones.  Keep the right amount of fluids and chemicals in the body.  This condition ranges from mild to severe. Over time, it may develop into long-lasting (chronic) kidney disease. Finding it and treating it early may keep it from becoming a long-lasting disease.  What are the causes?  Common causes of this condition include:  A problem with blood flow to the kidneys. This may be caused by:  Low blood pressure or shock.  Blood loss.  Heart and blood vessel disease.  Severe burns.  Liver disease.  Direct damage to the kidneys. This may be caused by:  Certain medicines.  A kidney infection.  Poisoning.  Being around or in contact with toxic substances.  A wound from surgery.  A hard, direct hit to the kidney area.  A sudden block in urine flow. This may be caused by:  Cancer.  Kidney stones.  An enlarged prostate.  What increases the risk?  Being older than age 65.  Being female.  Being in the hospital. This is especially true if you are very sick.  Having certain conditions, such as:  Long-lasting kidney or liver disease.  Diabetes.  Heart disease and heart failure.  Lung disease.  What are the signs or symptoms?  This condition may not cause symptoms until it becomes severe. Symptoms can include:  Feeling very tired or having trouble staying awake.  Nausea or vomiting.  Swelling (edema) of the face, legs, ankles, or feet.  Pain in the belly or pain along the side of your stomach (flank).  Urine changes, such as:  Making little or no urine.  Passing urine with a weak flow.  Muscle twitches and cramps, most often in the legs.  Confusion or trouble concentrating.  Not feeling the urge to eat.  Fever.  How is this diagnosed?  This condition may be diagnosed based on:  Your symptoms.  Your medical history.  A physical  exam.  You may have other tests, such as:  Blood tests.  Urine tests.  Imaging tests.  A kidney biopsy. This involves removing a sample of kidney tissue to be looked at under a microscope.  How is this treated?  Treatment depends on the cause and how severe the condition is. In mild cases, treatment may not be needed. The kidneys may heal on their own.  In severe cases, treatment may include:  Treating the cause of the kidney injury. This may mean that you have to change your medicines or the doses you take.  Getting fluids through an IV tube.  Having a small, thin tube (catheter) put in. This tube will drain urine and prevent blockages.  Trying to keep problems from starting. This may mean not using certain medicines or not having tests done that could cause more kidney injury.  In some cases, these treatments are also needed:  Dialysis or continuous renal replacement therapy (CRRT). This treatment uses a machine to do the job of the kidneys.  Surgery. This may be done to repair a damaged kidney. It could also be done to remove a blockage in the urinary tract.  Follow these instructions at home:  Medicines  Take over-the-counter and prescription medicines only as told by your health care provider.  Do not take any new medicines unless approved by your health care provider. Many medicines can make kidney damage worse.  Do not take any vitamin or mineral supplements unless approved by your health care provider. Some of these can make kidney damage worse.  Lifestyle    Make changes to your diet as told by your health care provider. You may need to eat less protein.  Get to, and stay at, a healthy weight. If you need help, ask your health care provider.  Start or keep up an exercise plan. Exercise at least 30 minutes a day, 5 days a week.  Do not smoke or use any products that contain nicotine or tobacco. If you need help quitting, ask your health care provider.  General instructions    Keep track of your blood  pressure. Tell your health care provider if you notice any changes.  Keep your vaccines up to date. Ask your health care provider which vaccines you need.  Keep all follow-up visits.  Where to find more information  American Association of Kidney Patients: www.aakp.org  National Kidney Foundation: www.kidney.org  [employer]: www.akfinc.org  [employer] [place]:  [employer]: www.lifeoptions.org  [employer]: www.kidneyschool.org  Contact a health care provider if:  Your symptoms get worse.  You have new symptoms such as:  Headaches.  Skin that is darker or lighter than normal.  Easy bruising.  Itchiness.  Hiccups.  Lack of menstrual periods.  You have a fever.  Get help right away if:  You have symptoms of worsening kidney disease, such as:  Chest pain.  Shortness of breath.  Seizures.  Confusion or trouble thinking.  Belly or back pain.  You have pain or bleeding when you pass urine.  You are making little or no urine.  These symptoms may be an emergency. Get help right away. Call 911.  Do not wait to see if the symptoms will go away.  Do not drive yourself to the hospital.  Summary  Acute kidney injury is a sudden worsening of kidney function.  This condition can be caused by problems with blood flow to the kidneys, damage to the kidneys, or a sudden block in urine flow.  This condition may not cause symptoms until it becomes severe.  Acute kidney injury can be diagnosed with blood tests, urine tests, imaging tests, and other tests.  Treatment depends on the cause and how severe the condition is.  This information is not intended to replace advice given to you by your health care provider. Make sure you discuss any questions you have with your health care provider.  Document Revised: 03/27/2023 Document Reviewed: 10/27/2020  Elsevier Patient Education © 2023 [employer]  Diverticulitis    Diverticulitis is when small pouches in your colon (large intestine) get infected or swollen. This causes  pain in the belly (abdomen) and watery poop (diarrhea).  These pouches are called diverticula. The pouches form in people who have a condition called diverticulosis.  What are the causes?  This condition may be caused by poop (stool) that gets trapped in the pouches in your colon. The poop lets germs (bacteria) grow in the pouches. This causes the infection.  What increases the risk?  You are more likely to get this condition if you have small pouches in your colon. The risk is higher if:  You are overweight or very overweight (obese).  You do not exercise enough.  You drink alcohol.  You smoke or use products with tobacco in them.  You eat a diet that has a lot of red meat such as beef, pork, or lamb.  You eat a diet that does not have enough fiber in it.  You are older than 40 years of age.  What are the signs or symptoms?  Pain in the belly. Pain is often on the left side, but it may be in other areas.  Fever and feeling cold.  Feeling like you may vomit.  Vomiting.  Having cramps.  Feeling full.  Changes to how often you poop.  Blood in your poop.  How is this treated?  Most cases are treated at home by:  Taking over-the-counter pain medicines.  Following a clear liquid diet.  Taking antibiotic medicines.  Resting.  Very bad cases may need to be treated at a hospital. This may include:  Not eating or drinking.  Taking prescription pain medicine.  Getting antibiotic medicines through an IV tube.  Getting fluid and food through an IV tube.  Having surgery.  When you are feeling better, your doctor may tell you to have a test to check your colon (colonoscopy).  Follow these instructions at home:  Medicines  Take over-the-counter and prescription medicines only as told by your doctor. These include:  Antibiotics.  Pain medicines.  Fiber pills.  Probiotics.  Stool softeners.  If you were prescribed an antibiotic medicine, take it as told by your doctor. Do not stop taking the antibiotic even if you start to feel  better.  Ask your doctor if the medicine prescribed to you requires you to avoid driving or using machinery.  Eating and drinking    Follow a diet as told by your doctor.  When you feel better, your doctor may tell you to change your diet. You may need to eat a lot of fiber. Fiber makes it easier to poop (have a bowel movement). Foods with fiber include:  Berries.  Beans.  Lentils.  Green vegetables.  Avoid eating red meat.  General instructions  Do not use any products that contain nicotine or tobacco, such as cigarettes, e-cigarettes, and chewing tobacco. If you need help quitting, ask your doctor.  Exercise 3 or more times a week. Try to get 30 minutes each time. Exercise enough to sweat and make your heart beat faster.  Keep all follow-up visits as told by your doctor. This is important.  Contact a doctor if:  Your pain does not get better.  You are not pooping like normal.  Get help right away if:  Your pain gets worse.  Your symptoms do not get better.  Your symptoms get worse very fast.  You have a fever.  You vomit more than one time.  You have poop that is:  Bloody.  Black.  Tarry.  Summary  This condition happens when small pouches in your colon get infected or swollen.  Take medicines only as told by your doctor.  Follow a diet as told by your doctor.  Keep all follow-up visits as told by your doctor. This is important.  This information is not intended to replace advice given to you by your health care provider. Make sure you discuss any questions you have with your health care provider.  Document Revised: 09/28/2020 Document Reviewed: 09/28/2020  [employer] Patient Education © 2023 [employer].

## 2023-06-29 NOTE — ED NOTES
Pt transferred off unit by transport tech. No signs/symptoms of acute distress or pain noted at time of transfer

## 2023-06-29 NOTE — ASSESSMENT & PLAN NOTE
High anion gap metabolic lactic acidosis likely secondary to nausea vomiting, dehydration.  Considered possible euglycemic DKA in the setting of recently starting an SGLT2 inhibitor, however beta hydroxybutyrate was negative at presentation  Anion gap at presentation of 18, repeat BMP pending  Lactate 2.7-> 2.6  Status post 3 L IV fluids  CT abdomen pelvis was negative for ileus or colitis and patient does not appear to have a surgical abdomen with significant abdominal pain.  If lactate fails to improve or trends upward would have low threshold to further work-up potential bowel source.    - Trend lactate  - Maintenance fluids  -Low threshold to repeat abdominal imaging if worsening abdominal pain

## 2023-06-29 NOTE — DISCHARGE SUMMARY
Discharge Summary    CHIEF COMPLAINT ON ADMISSION  Chief Complaint   Patient presents with    Abdominal Pain     Since Sunday. History of bowel obstruction 10 years prior. Reports to passing flatus.     N/V     X 3 days.        Reason for Admission  ABD Pain, N/V     Admission Date  6/28/2023    CODE STATUS  Full Code    HPI & HOSPITAL COURSE  This is a 62 y.o. male here with type 2 diabetes mellitus recently started on farxiga, chronic marijuana use with recurrent hospitalizations for nausea vomiting and dehydration who presented to our hospital with recurrent nausea vomiting and abdominal pain.  Upon admission, he was found to have acute kidney injury with a mild anion gap metabolic acidosis, leukocytosis, and mild hyponatremia.  His beta hydroxybutyrate was normal.  His CT abdomen pelvis was unremarkable except for hepatic steatosis.  He was admitted to the hospital and placed on conservative care with IV fluids and bowel rest.    His symptoms improved readily.  He was able to tolerate an oral diet without issue and his kidney function returned to baseline with IV fluids.  He had no evidence of euglycemic DKA.  I informed the patient that he should avoid marijuana and also stop taking his SGL 2 inhibitor until he can follow-up with his primary care physician.  This latter medication was started for prediabetes.  His blood sugars here remain decently controlled.    Therefore, he is discharged in good and stable condition to home with close outpatient follow-up.    The patient recovered much more quickly than anticipated on admission.    Discharge Date  6/29/2023    FOLLOW UP ITEMS POST DISCHARGE  Follow-up with his primary care physician 1 to 2 weeks to discuss reinitiating SGL 2 inhibitor or considering a different medication for prediabetes.  He was found to have incidental bilateral adrenal adenomas which will need surveillance imaging in 6 months.    DISCHARGE DIAGNOSES  Principal Problem:    Acute renal  "failure (ARF) (HCC) (POA: Yes)  Active Problems:    Marijuana abuse (POA: Yes)    Dehydration (POA: Yes)    Metabolic acidosis (POA: Unknown)    History of diverticulitis (POA: Unknown)    Adrenal nodule (HCC) (POA: Unknown)    Nausea and vomiting (POA: Unknown)  Resolved Problems:    * No resolved hospital problems. *      FOLLOW UP  No future appointments.  No follow-up provider specified.    MEDICATIONS ON DISCHARGE     Medication List        CONTINUE taking these medications        Instructions   omeprazole 20 MG delayed-release capsule  Commonly known as: PRILOSEC   Take 20 mg by mouth every morning. Indications: Heartburn  Dose: 20 mg     promethazine 12.5 MG Supp  Commonly known as: PHENERGAN   Insert 25 mg into the rectum every 8 hours as needed for Nausea/Vomiting.  Dose: 25 mg     rosuvastatin 10 MG Tabs  Commonly known as: CRESTOR   Take 10 mg by mouth at bedtime.  Dose: 10 mg     tamsulosin 0.4 MG capsule  Commonly known as: FLOMAX   Take 0.4 mg by mouth 1/2 hour after breakfast.  Dose: 0.4 mg     vitamin D2 (Ergocalciferol) 1.25 MG (05681 UT) Caps capsule  Commonly known as: Drisdol   Take 50,000 Units by mouth every Sunday.  Dose: 50,000 Units            STOP taking these medications      Farxiga 10 MG Tabs  Generic drug: dapagliflozin propanediol              Allergies  Allergies   Allergen Reactions    Ciprofloxacin      Pt reports \"feeling like I was on speed\"    Rocephin [Ceftriaxone] Itching       DIET  Orders Placed This Encounter   Procedures    Diet Order Diet: Regular     Standing Status:   Standing     Number of Occurrences:   1     Order Specific Question:   Diet:     Answer:   Regular [1]       ACTIVITY  As tolerated.  Weight bearing as tolerated    CONSULTATIONS  NONE    PROCEDURES  CT-ABDOMEN-PELVIS WITH   Final Result      1.  Hepatic steatosis.   2.  Bilateral adrenal nodules presumably represent adrenal adenomas.   3.  No acute abnormality.            LABORATORY  Lab Results "   Component Value Date    SODIUM 137 06/29/2023    POTASSIUM 4.0 06/29/2023    CHLORIDE 104 06/29/2023    CO2 22 06/29/2023    GLUCOSE 102 (H) 06/29/2023    BUN 17 06/29/2023    CREATININE 1.18 06/29/2023        Lab Results   Component Value Date    WBC 13.4 (H) 06/29/2023    HEMOGLOBIN 15.2 06/29/2023    HEMATOCRIT 44.8 06/29/2023    PLATELETCT 313 06/29/2023        Total time of the discharge process exceeds 32 minutes.

## 2023-06-29 NOTE — CARE PLAN
The patient is Stable - Low risk of patient condition declining or worsening    Shift Goals  Clinical Goals: comfort  Patient Goals: rest    Progress made toward(s) clinical / shift goals:      Patient is not progressing towards the following goals:

## 2023-06-29 NOTE — H&P
[facility] Internal Medicine History & Physical Note    Date of Service  6/28/2023    Attending: Berry Osuna M.d.  Senior Resident: Dr. Reece    Contact Number: [phone]    Primary Care Physician  Pcp Pt States None    Code Status  Full Code    Chief Complaint  Chief Complaint   Patient presents with    Abdominal Pain     Since Sunday. History of bowel obstruction 10 years prior. Reports to passing flatus.     N/V     X 3 days.        History of Presenting Illness (HPI):   Gustavo Prieto is a 62 y.o. male with diabetes (recently started on SGLT2 inhibitor), marijuana use history of complicated diverticulitis with perforation in 2014 requiring colostomy status post subsequent takedown, recurrent hospitalizations for nausea/vomiting and dehydration, who presented 6/28/2023 with similar symptoms of nausea and vomiting.    Patient states that proximately 4 days prior on Sunday he began feeling unwell, though did not start having emesis until Monday afternoon.  He states since then he has had multiple episodes of nonbloody emesis with poor p.o. intake.  He denies any diarrhea, and states his last bowel movement was on Sunday which was normal.  He denies any abdominal pain, urinary symptoms, or other acute complaints.    At the ED patient was found to be afebrile and hemodynamically stable satting well on room air.  Lab work-up notable for mild leukocytosis of 14.7, sodium 134, bicarb 16, anion gap of 19, glucose 166, BUN/creatinine 21/1.78, lactate 2.7-> 2.6 after 3 L IV fluids, beta hydroxy butyrate is normal.  CT abdomen pelvis revealed stable bilateral adrenal nodules as well as hepatic steatosis, otherwise no acute abnormality to indicate ileitis or colitis.    I discussed the plan of care with patient and hospitalist, Dr. Osuna .  Patient will be admitted for JAG and anion gap metabolic acidosis in setting of dehydration.    Review of Systems  Review of Systems   Constitutional:  Positive for  "malaise/fatigue. Negative for chills and fever.   Gastrointestinal:  Positive for nausea and vomiting. Negative for abdominal pain, diarrhea and melena.   Genitourinary:  Negative for dysuria, flank pain, frequency, hematuria and urgency.   Neurological:  Negative for dizziness and weakness.   Psychiatric/Behavioral:  Positive for substance abuse.        Past Medical History   has a past medical history of S/P colostomy (HCC) (1/2014).    Surgical History   has a past surgical history that includes exploratory laparotomy (1/15/2014); colon resection (1/15/2014); and colostomy takedown (6/9/2014).     Family History  family history includes Diabetes in his father; Heart Attack in his father.   Family history reviewed with patient.     Social History  Tobacco: History of, not currently using  Alcohol: Occasional  Recreational drugs (illegal or prescription): Daily marijuana use  Recent Travel: Denies  Primary Care Provider: Reviewed      Allergies  Allergies   Allergen Reactions    Ciprofloxacin      Pt reports \"feeling like I was on speed\"    Rocephin [Ceftriaxone] Itching       Medications  Prior to Admission Medications   Prescriptions Last Dose Informant Patient Reported? Taking?   dapagliflozin propanediol (FARXIGA) 10 MG Tab 6/27/2023 at hs  Yes Yes   Sig: Take 10 mg by mouth at bedtime.   omeprazole (PRILOSEC) 20 MG delayed-release capsule 6/28/2023 at am  Yes Yes   Sig: Take 20 mg by mouth every morning. Indications: Heartburn   promethazine (PHENERGAN) 12.5 MG Suppos unknown at unknown  Yes Yes   Sig: Insert 25 mg into the rectum every 8 hours as needed for Nausea/Vomiting.   rosuvastatin (CRESTOR) 10 MG Tab 6/27/2023 at hs  Yes Yes   Sig: Take 10 mg by mouth at bedtime.   tamsulosin (FLOMAX) 0.4 MG capsule 6/28/2023 at am  Yes Yes   Sig: Take 0.4 mg by mouth 1/2 hour after breakfast.   vitamin D2, Ergocalciferol, (DRISDOL) 1.25 MG (41220 UT) Cap capsule 6/25/2023 at unk  Yes Yes   Sig: Take 50,000 Units by " mouth every Sunday.      Facility-Administered Medications: None       Physical Exam  Temp:  [36.6 °C (97.8 °F)-36.8 °C (98.2 °F)] 36.6 °C (97.8 °F)  Pulse:  [69-93] 71  Resp:  [15-18] 18  BP: (129-186)/() 151/105  SpO2:  [84 %-100 %] 95 %  Blood Pressure: (!) 151/105 (NURSE AWARE)   Temperature: 36.6 °C (97.8 °F)   Pulse: 71   Respiration: 18   Pulse Oximetry: 95 %       Physical Exam  Constitutional:       General: He is not in acute distress.     Appearance: He is not toxic-appearing.   HENT:      Mouth/Throat:      Mouth: Mucous membranes are moist.      Pharynx: No oropharyngeal exudate.   Eyes:      General: No scleral icterus.     Extraocular Movements: Extraocular movements intact.      Pupils: Pupils are equal, round, and reactive to light.   Cardiovascular:      Rate and Rhythm: Normal rate and regular rhythm.      Pulses: Normal pulses.      Heart sounds: Normal heart sounds. No murmur heard.  Pulmonary:      Effort: Pulmonary effort is normal. No respiratory distress.      Breath sounds: Normal breath sounds.   Abdominal:      General: Abdomen is flat. Bowel sounds are normal. There is no distension.      Palpations: Abdomen is soft.      Tenderness: There is no abdominal tenderness. There is no right CVA tenderness, left CVA tenderness or guarding.   Musculoskeletal:         General: No swelling. Normal range of motion.      Cervical back: Normal range of motion.   Skin:     General: Skin is warm and dry.   Neurological:      General: No focal deficit present.      Mental Status: He is alert and oriented to person, place, and time.      Motor: No weakness.   Psychiatric:         Mood and Affect: Mood normal.         Behavior: Behavior normal.         Thought Content: Thought content normal.         Judgment: Judgment normal.         Laboratory:  Recent Labs     06/28/23  1349   WBC 14.7*   RBC 6.17*   HEMOGLOBIN 18.1*   HEMATOCRIT 53.2*   MCV 86.2   MCH 29.3   MCHC 34.0   RDW 45.5   PLATELETCT 413    MPV 10.7     Recent Labs     06/28/23  1349 06/28/23  2234   SODIUM 134* 137   POTASSIUM 4.5 4.4   CHLORIDE 99 104   CO2 16* 19*   GLUCOSE 166* 105*   BUN 21 18   CREATININE 1.78* 1.36   CALCIUM 10.5 8.8     Recent Labs     06/28/23  1349 06/28/23  2234   ALTSGPT 40  --    ASTSGOT 23  --    ALKPHOSPHAT 115*  --    TBILIRUBIN 0.7  --    LIPASE 20  --    GLUCOSE 166* 105*         No results for input(s): NTPROBNP in the last 72 hours.      Recent Labs     06/28/23  1349   TROPONINT 11       Imaging:  CT-ABDOMEN-PELVIS WITH   Final Result      1.  Hepatic steatosis.   2.  Bilateral adrenal nodules presumably represent adrenal adenomas.   3.  No acute abnormality.          EKG:  I have personally reviewed the images and compared with prior images.  Sinus rhythm, no ischemic changes    Assessment/Plan:  Problem Representation:   Gustavo Prieto is a 62 y.o. male with diabetes (recently started on SGLT2 inhibitor), marijuana use history of complicated diverticulitis with perforation in 2014 requiring colostomy status post subsequent takedown, recurrent hospitalizations for nausea/vomiting and dehydration, who presented 6/28/2023 with similar symptoms of nausea and vomiting, admitted for JAG and anion gap metabolic acidosis.    I anticipate this patient will require at least two midnights for appropriate medical management, necessitating inpatient admission because further work-up of JAG and acidosis.    Patient will need a Med/Surg bed on MEDICAL service.     * Acute renal failure (ARF) (HCC)- (present on admission)  Assessment & Plan  Likely prerenal in setting of dehydration due to nausea vomiting  Status post 3 L IV fluids    - Monitor BMP  - Monitor urine output  - Avoid nephrotoxins    Nausea and vomiting  Assessment & Plan  CT abdomen pelvis nonconcerning  Does not appear to be in euglycemic DKA  Have suspicion is related to cyclic vomiting syndrome with ongoing daily marijuana use  Symptoms have improved  with antiemetics and IV fluids    - Marijuana cessation counseling as below  - Given patient's complicated GI history if he were to develop significant abdominal pain would have low threshold for repeat imaging  - Will monitor BMP and lactate, if failure to improve would reconsider possible euglycemic DKA.  - As needed antiemetics, monitor for QT prolongation    Metabolic acidosis  Assessment & Plan  High anion gap metabolic lactic acidosis likely secondary to nausea vomiting, dehydration.  Considered possible euglycemic DKA in the setting of recently starting an SGLT2 inhibitor, however beta hydroxybutyrate was negative at presentation  Anion gap at presentation of 18, repeat BMP pending  Lactate 2.7-> 2.6  Status post 3 L IV fluids  CT abdomen pelvis was negative for ileus or colitis and patient does not appear to have a surgical abdomen with significant abdominal pain.  If lactate fails to improve or trends upward would have low threshold to further work-up potential bowel source.    - Trend lactate  - Maintenance fluids  -Low threshold to repeat abdominal imaging if worsening abdominal pain        Dehydration- (present on admission)  Assessment & Plan  Secondary to vomiting for past 3 days  Status post 3 L IV fluids in the ED  Hemodynamically stable    - Given persistent elevated lactate we will keep on maintenance IV fluids until able to tolerate p.o.    Marijuana abuse- (present on admission)  Assessment & Plan  Counseled patient on possible cyclic vomiting syndrome.  Cessation advised    Adrenal nodule (HCC)  Assessment & Plan  Bilateral adrenal nodules noted on CT abdomen.  These appear stable from previous imaging.    History of diverticulitis  Assessment & Plan  History of diverticulitis complicated by perforation requiring colostomy currently has had colostomy takedown.  Clinical presentation not concerning for acute colitis picture at this time.    - Continue to monitor  - Consider GI consult if patient  develops worsening GI symptoms.        VTE prophylaxis: SCDs/TEDs and Xarelto 10 mg daily as prophylaxis

## 2023-06-29 NOTE — ED NOTES
Repeat lactic drawn and sent to lab.    Fluid bolus initiated.     Pt requesting medication for acid reflux. ERP notified.

## 2023-06-29 NOTE — ASSESSMENT & PLAN NOTE
CT abdomen pelvis nonconcerning  Does not appear to be in euglycemic DKA  Have suspicion is related to cyclic vomiting syndrome with ongoing daily marijuana use  Symptoms have improved with antiemetics and IV fluids    - Marijuana cessation counseling as below  - Given patient's complicated GI history if he were to develop significant abdominal pain would have low threshold for repeat imaging  - Will monitor BMP and lactate, if failure to improve would reconsider possible euglycemic DKA.  - As needed antiemetics, monitor for QT prolongation

## 2023-06-29 NOTE — ASSESSMENT & PLAN NOTE
Likely prerenal in setting of dehydration due to nausea vomiting  Status post 3 L IV fluids    - Monitor BMP  - Monitor urine output  - Avoid nephrotoxins

## 2023-06-29 NOTE — ED NOTES
Patient appears to be sleeping, respirations even and unlabored. No signs/symptoms of acute distress or pain noted.

## 2023-07-11 ENCOUNTER — HOSPITAL ENCOUNTER (OUTPATIENT)
Dept: LAB | Facility: MEDICAL CENTER | Age: 63
End: 2023-07-11
Payer: COMMERCIAL

## 2023-07-11 LAB
ALBUMIN SERPL BCP-MCNC: 4.4 G/DL (ref 3.2–4.9)
ALBUMIN/GLOB SERPL: 1.7 G/DL
ALP SERPL-CCNC: 87 U/L (ref 30–99)
ALT SERPL-CCNC: 61 U/L (ref 2–50)
ANION GAP SERPL CALC-SCNC: 14 MMOL/L (ref 7–16)
AST SERPL-CCNC: 31 U/L (ref 12–45)
BASOPHILS # BLD AUTO: 0.8 % (ref 0–1.8)
BASOPHILS # BLD: 0.06 K/UL (ref 0–0.12)
BILIRUB SERPL-MCNC: 0.3 MG/DL (ref 0.1–1.5)
BUN SERPL-MCNC: 14 MG/DL (ref 8–22)
CALCIUM ALBUM COR SERPL-MCNC: 9 MG/DL (ref 8.5–10.5)
CALCIUM SERPL-MCNC: 9.3 MG/DL (ref 8.5–10.5)
CHLORIDE SERPL-SCNC: 108 MMOL/L (ref 96–112)
CHOLEST SERPL-MCNC: 217 MG/DL (ref 100–199)
CO2 SERPL-SCNC: 20 MMOL/L (ref 20–33)
CREAT SERPL-MCNC: 1.03 MG/DL (ref 0.5–1.4)
EOSINOPHIL # BLD AUTO: 0.36 K/UL (ref 0–0.51)
EOSINOPHIL NFR BLD: 4.9 % (ref 0–6.9)
ERYTHROCYTE [DISTWIDTH] IN BLOOD BY AUTOMATED COUNT: 49 FL (ref 35.9–50)
EST. AVERAGE GLUCOSE BLD GHB EST-MCNC: 137 MG/DL
FASTING STATUS PATIENT QL REPORTED: NORMAL
GFR SERPLBLD CREATININE-BSD FMLA CKD-EPI: 83 ML/MIN/1.73 M 2
GLOBULIN SER CALC-MCNC: 2.6 G/DL (ref 1.9–3.5)
GLUCOSE SERPL-MCNC: 109 MG/DL (ref 65–99)
HBA1C MFR BLD: 6.4 % (ref 4–5.6)
HCT VFR BLD AUTO: 49 % (ref 42–52)
HDLC SERPL-MCNC: 39 MG/DL
HGB BLD-MCNC: 16.1 G/DL (ref 14–18)
IMM GRANULOCYTES # BLD AUTO: 0.04 K/UL (ref 0–0.11)
IMM GRANULOCYTES NFR BLD AUTO: 0.5 % (ref 0–0.9)
LDLC SERPL CALC-MCNC: 150 MG/DL
LYMPHOCYTES # BLD AUTO: 2.66 K/UL (ref 1–4.8)
LYMPHOCYTES NFR BLD: 36.3 % (ref 22–41)
MCH RBC QN AUTO: 29.7 PG (ref 27–33)
MCHC RBC AUTO-ENTMCNC: 32.9 G/DL (ref 32.3–36.5)
MCV RBC AUTO: 90.4 FL (ref 81.4–97.8)
MONOCYTES # BLD AUTO: 0.65 K/UL (ref 0–0.85)
MONOCYTES NFR BLD AUTO: 8.9 % (ref 0–13.4)
NEUTROPHILS # BLD AUTO: 3.56 K/UL (ref 1.82–7.42)
NEUTROPHILS NFR BLD: 48.6 % (ref 44–72)
NRBC # BLD AUTO: 0 K/UL
NRBC BLD-RTO: 0 /100 WBC (ref 0–0.2)
PLATELET # BLD AUTO: 364 K/UL (ref 164–446)
PMV BLD AUTO: 11 FL (ref 9–12.9)
POTASSIUM SERPL-SCNC: 4.2 MMOL/L (ref 3.6–5.5)
PROT SERPL-MCNC: 7 G/DL (ref 6–8.2)
PSA SERPL-MCNC: 3.58 NG/ML (ref 0–4)
RBC # BLD AUTO: 5.42 M/UL (ref 4.7–6.1)
SODIUM SERPL-SCNC: 142 MMOL/L (ref 135–145)
TRIGL SERPL-MCNC: 139 MG/DL (ref 0–149)
TSH SERPL DL<=0.005 MIU/L-ACNC: 0.83 UIU/ML (ref 0.38–5.33)
WBC # BLD AUTO: 7.3 K/UL (ref 4.8–10.8)

## 2023-07-11 PROCEDURE — 84443 ASSAY THYROID STIM HORMONE: CPT

## 2023-07-11 PROCEDURE — 80061 LIPID PANEL: CPT

## 2023-07-11 PROCEDURE — 80053 COMPREHEN METABOLIC PANEL: CPT

## 2023-07-11 PROCEDURE — 84153 ASSAY OF PSA TOTAL: CPT

## 2023-07-11 PROCEDURE — 36415 COLL VENOUS BLD VENIPUNCTURE: CPT

## 2023-07-11 PROCEDURE — 85025 COMPLETE CBC W/AUTO DIFF WBC: CPT

## 2023-07-11 PROCEDURE — 83036 HEMOGLOBIN GLYCOSYLATED A1C: CPT

## 2024-06-27 ENCOUNTER — HOSPITAL ENCOUNTER (OUTPATIENT)
Facility: MEDICAL CENTER | Age: 64
End: 2024-06-28
Attending: EMERGENCY MEDICINE | Admitting: HOSPITALIST

## 2024-06-27 ENCOUNTER — APPOINTMENT (OUTPATIENT)
Dept: RADIOLOGY | Facility: MEDICAL CENTER | Age: 64
End: 2024-06-27
Attending: EMERGENCY MEDICINE

## 2024-06-27 DIAGNOSIS — R11.2 NAUSEA AND VOMITING, UNSPECIFIED VOMITING TYPE: ICD-10-CM

## 2024-06-27 DIAGNOSIS — R19.7 DIARRHEA, UNSPECIFIED TYPE: ICD-10-CM

## 2024-06-27 DIAGNOSIS — K21.9 GASTROESOPHAGEAL REFLUX DISEASE WITHOUT ESOPHAGITIS: ICD-10-CM

## 2024-06-27 DIAGNOSIS — I16.0 HYPERTENSIVE URGENCY: ICD-10-CM

## 2024-06-27 DIAGNOSIS — N17.9 AKI (ACUTE KIDNEY INJURY) (HCC): ICD-10-CM

## 2024-06-27 DIAGNOSIS — E86.0 DEHYDRATION: ICD-10-CM

## 2024-06-27 PROBLEM — K76.0 FATTY LIVER: Status: ACTIVE | Noted: 2024-06-27

## 2024-06-27 PROBLEM — R10.9 AP (ABDOMINAL PAIN): Status: ACTIVE | Noted: 2024-06-27

## 2024-06-27 PROBLEM — D75.1 POLYCYTHEMIA: Status: ACTIVE | Noted: 2024-06-27

## 2024-06-27 LAB
ALBUMIN SERPL BCP-MCNC: 5.1 G/DL (ref 3.2–4.9)
ALBUMIN/GLOB SERPL: 1.6 G/DL
ALP SERPL-CCNC: 118 U/L (ref 30–99)
ALT SERPL-CCNC: 21 U/L (ref 2–50)
AMPHET UR QL SCN: NEGATIVE
ANION GAP SERPL CALC-SCNC: 21 MMOL/L (ref 7–16)
APPEARANCE UR: CLEAR
AST SERPL-CCNC: 15 U/L (ref 12–45)
BACTERIA #/AREA URNS HPF: NEGATIVE /HPF
BARBITURATES UR QL SCN: NEGATIVE
BASOPHILS # BLD AUTO: 0.2 % (ref 0–1.8)
BASOPHILS # BLD: 0.03 K/UL (ref 0–0.12)
BENZODIAZ UR QL SCN: NEGATIVE
BILIRUB SERPL-MCNC: 1 MG/DL (ref 0.1–1.5)
BILIRUB UR QL STRIP.AUTO: NEGATIVE
BUN SERPL-MCNC: 22 MG/DL (ref 8–22)
BZE UR QL SCN: NEGATIVE
C PARVUM AG STL QL IA.RAPID: NEGATIVE
CALCIUM ALBUM COR SERPL-MCNC: 9.7 MG/DL (ref 8.5–10.5)
CALCIUM SERPL-MCNC: 10.6 MG/DL (ref 8.5–10.5)
CANNABINOIDS UR QL SCN: POSITIVE
CHLORIDE SERPL-SCNC: 102 MMOL/L (ref 96–112)
CO2 SERPL-SCNC: 14 MMOL/L (ref 20–33)
COLOR UR: YELLOW
CREAT SERPL-MCNC: 1.46 MG/DL (ref 0.5–1.4)
EOSINOPHIL # BLD AUTO: 0 K/UL (ref 0–0.51)
EOSINOPHIL NFR BLD: 0 % (ref 0–6.9)
EPI CELLS #/AREA URNS HPF: NEGATIVE /HPF
ERYTHROCYTE [DISTWIDTH] IN BLOOD BY AUTOMATED COUNT: 47.7 FL (ref 35.9–50)
FENTANYL UR QL: NEGATIVE
FLUAV RNA SPEC QL NAA+PROBE: NEGATIVE
FLUBV RNA SPEC QL NAA+PROBE: NEGATIVE
G LAMBLIA AG STL QL IA.RAPID: NEGATIVE
GFR SERPLBLD CREATININE-BSD FMLA CKD-EPI: 54 ML/MIN/1.73 M 2
GLOBULIN SER CALC-MCNC: 3.2 G/DL (ref 1.9–3.5)
GLUCOSE BLD STRIP.AUTO-MCNC: 163 MG/DL (ref 65–99)
GLUCOSE SERPL-MCNC: 166 MG/DL (ref 65–99)
GLUCOSE UR STRIP.AUTO-MCNC: NEGATIVE MG/DL
H PYLORI AG STL QL IA: NOT DETECTED
HCT VFR BLD AUTO: 54.1 % (ref 42–52)
HGB BLD-MCNC: 18.6 G/DL (ref 14–18)
HYALINE CASTS #/AREA URNS LPF: ABNORMAL /LPF
IMM GRANULOCYTES # BLD AUTO: 0.06 K/UL (ref 0–0.11)
IMM GRANULOCYTES NFR BLD AUTO: 0.4 % (ref 0–0.9)
KETONES UR STRIP.AUTO-MCNC: ABNORMAL MG/DL
LACTATE SERPL-SCNC: 1.4 MMOL/L (ref 0.5–2)
LACTATE SERPL-SCNC: 1.6 MMOL/L (ref 0.5–2)
LACTATE SERPL-SCNC: 3.4 MMOL/L (ref 0.5–2)
LACTATE SERPL-SCNC: 5.7 MMOL/L (ref 0.5–2)
LACTATE SERPL-SCNC: 5.7 MMOL/L (ref 0.5–2)
LEUKOCYTE ESTERASE UR QL STRIP.AUTO: NEGATIVE
LIPASE SERPL-CCNC: 24 U/L (ref 11–82)
LYMPHOCYTES # BLD AUTO: 1.95 K/UL (ref 1–4.8)
LYMPHOCYTES NFR BLD: 14 % (ref 22–41)
MCH RBC QN AUTO: 29.3 PG (ref 27–33)
MCHC RBC AUTO-ENTMCNC: 34.4 G/DL (ref 32.3–36.5)
MCV RBC AUTO: 85.2 FL (ref 81.4–97.8)
METHADONE UR QL SCN: NEGATIVE
MICRO URNS: ABNORMAL
MONOCYTES # BLD AUTO: 0.84 K/UL (ref 0–0.85)
MONOCYTES NFR BLD AUTO: 6 % (ref 0–13.4)
NEUTROPHILS # BLD AUTO: 11.09 K/UL (ref 1.82–7.42)
NEUTROPHILS NFR BLD: 79.4 % (ref 44–72)
NITRITE UR QL STRIP.AUTO: NEGATIVE
NRBC # BLD AUTO: 0 K/UL
NRBC BLD-RTO: 0 /100 WBC (ref 0–0.2)
OPIATES UR QL SCN: POSITIVE
OXYCODONE UR QL SCN: NEGATIVE
PCP UR QL SCN: NEGATIVE
PH UR STRIP.AUTO: 5 [PH] (ref 5–8)
PLATELET # BLD AUTO: 414 K/UL (ref 164–446)
PMV BLD AUTO: 10 FL (ref 9–12.9)
POTASSIUM SERPL-SCNC: 4.7 MMOL/L (ref 3.6–5.5)
PROPOXYPH UR QL SCN: NEGATIVE
PROT SERPL-MCNC: 8.3 G/DL (ref 6–8.2)
PROT UR QL STRIP: 100 MG/DL
RBC # BLD AUTO: 6.35 M/UL (ref 4.7–6.1)
RBC # URNS HPF: ABNORMAL /HPF
RBC UR QL AUTO: ABNORMAL
RSV RNA SPEC QL NAA+PROBE: NEGATIVE
SARS-COV-2 RNA RESP QL NAA+PROBE: NOTDETECTED
SODIUM SERPL-SCNC: 137 MMOL/L (ref 135–145)
SP GR UR STRIP.AUTO: 1.03
UROBILINOGEN UR STRIP.AUTO-MCNC: 0.2 MG/DL
WBC # BLD AUTO: 14 K/UL (ref 4.8–10.8)
WBC #/AREA URNS HPF: ABNORMAL /HPF

## 2024-06-27 PROCEDURE — 87338 HPYLORI STOOL AG IA: CPT

## 2024-06-27 PROCEDURE — 80053 COMPREHEN METABOLIC PANEL: CPT

## 2024-06-27 PROCEDURE — 700111 HCHG RX REV CODE 636 W/ 250 OVERRIDE (IP): Mod: JZ | Performed by: EMERGENCY MEDICINE

## 2024-06-27 PROCEDURE — 96376 TX/PRO/DX INJ SAME DRUG ADON: CPT

## 2024-06-27 PROCEDURE — 81001 URINALYSIS AUTO W/SCOPE: CPT

## 2024-06-27 PROCEDURE — 36415 COLL VENOUS BLD VENIPUNCTURE: CPT

## 2024-06-27 PROCEDURE — 99285 EMERGENCY DEPT VISIT HI MDM: CPT

## 2024-06-27 PROCEDURE — 700105 HCHG RX REV CODE 258: Performed by: HOSPITALIST

## 2024-06-27 PROCEDURE — A9270 NON-COVERED ITEM OR SERVICE: HCPCS | Performed by: HOSPITALIST

## 2024-06-27 PROCEDURE — 700102 HCHG RX REV CODE 250 W/ 637 OVERRIDE(OP): Performed by: HOSPITALIST

## 2024-06-27 PROCEDURE — 700105 HCHG RX REV CODE 258: Performed by: EMERGENCY MEDICINE

## 2024-06-27 PROCEDURE — 0241U HCHG SARS-COV-2 COVID-19 NFCT DS RESP RNA 4 TRGT ED POC: CPT

## 2024-06-27 PROCEDURE — 87040 BLOOD CULTURE FOR BACTERIA: CPT | Mod: 91

## 2024-06-27 PROCEDURE — 71045 X-RAY EXAM CHEST 1 VIEW: CPT

## 2024-06-27 PROCEDURE — 96375 TX/PRO/DX INJ NEW DRUG ADDON: CPT

## 2024-06-27 PROCEDURE — 700111 HCHG RX REV CODE 636 W/ 250 OVERRIDE (IP): Performed by: HOSPITALIST

## 2024-06-27 PROCEDURE — 80307 DRUG TEST PRSMV CHEM ANLYZR: CPT

## 2024-06-27 PROCEDURE — 87329 GIARDIA AG IA: CPT

## 2024-06-27 PROCEDURE — 87077 CULTURE AEROBIC IDENTIFY: CPT

## 2024-06-27 PROCEDURE — 99223 1ST HOSP IP/OBS HIGH 75: CPT | Performed by: HOSPITALIST

## 2024-06-27 PROCEDURE — 96374 THER/PROPH/DIAG INJ IV PUSH: CPT

## 2024-06-27 PROCEDURE — 83605 ASSAY OF LACTIC ACID: CPT

## 2024-06-27 PROCEDURE — 87086 URINE CULTURE/COLONY COUNT: CPT

## 2024-06-27 PROCEDURE — 87328 CRYPTOSPORIDIUM AG IA: CPT

## 2024-06-27 PROCEDURE — 83690 ASSAY OF LIPASE: CPT

## 2024-06-27 PROCEDURE — 85025 COMPLETE CBC W/AUTO DIFF WBC: CPT

## 2024-06-27 PROCEDURE — G0378 HOSPITAL OBSERVATION PER HR: HCPCS

## 2024-06-27 PROCEDURE — 82962 GLUCOSE BLOOD TEST: CPT

## 2024-06-27 PROCEDURE — 87425 ROTAVIRUS AG IA: CPT

## 2024-06-27 PROCEDURE — 74176 CT ABD & PELVIS W/O CONTRAST: CPT

## 2024-06-27 RX ORDER — PROMETHAZINE HYDROCHLORIDE 25 MG/1
12.5-25 TABLET ORAL EVERY 4 HOURS PRN
Status: DISCONTINUED | OUTPATIENT
Start: 2024-06-27 | End: 2024-06-28 | Stop reason: HOSPADM

## 2024-06-27 RX ORDER — HYDRALAZINE HYDROCHLORIDE 20 MG/ML
10 INJECTION INTRAMUSCULAR; INTRAVENOUS EVERY 4 HOURS PRN
Status: DISCONTINUED | OUTPATIENT
Start: 2024-06-27 | End: 2024-06-28 | Stop reason: HOSPADM

## 2024-06-27 RX ORDER — AMOXICILLIN 250 MG
2 CAPSULE ORAL 2 TIMES DAILY
Status: DISCONTINUED | OUTPATIENT
Start: 2024-06-27 | End: 2024-06-28 | Stop reason: HOSPADM

## 2024-06-27 RX ORDER — LABETALOL HYDROCHLORIDE 5 MG/ML
10 INJECTION, SOLUTION INTRAVENOUS EVERY 4 HOURS PRN
Status: DISCONTINUED | OUTPATIENT
Start: 2024-06-27 | End: 2024-06-28 | Stop reason: HOSPADM

## 2024-06-27 RX ORDER — HYDROMORPHONE HYDROCHLORIDE 1 MG/ML
0.25 INJECTION, SOLUTION INTRAMUSCULAR; INTRAVENOUS; SUBCUTANEOUS
Status: DISCONTINUED | OUTPATIENT
Start: 2024-06-27 | End: 2024-06-28 | Stop reason: HOSPADM

## 2024-06-27 RX ORDER — ENOXAPARIN SODIUM 100 MG/ML
40 INJECTION SUBCUTANEOUS DAILY
Status: DISCONTINUED | OUTPATIENT
Start: 2024-06-28 | End: 2024-06-28 | Stop reason: HOSPADM

## 2024-06-27 RX ORDER — ONDANSETRON 4 MG/1
4 TABLET, ORALLY DISINTEGRATING ORAL EVERY 4 HOURS PRN
Status: DISCONTINUED | OUTPATIENT
Start: 2024-06-27 | End: 2024-06-28 | Stop reason: HOSPADM

## 2024-06-27 RX ORDER — SODIUM CHLORIDE, SODIUM LACTATE, POTASSIUM CHLORIDE, CALCIUM CHLORIDE 600; 310; 30; 20 MG/100ML; MG/100ML; MG/100ML; MG/100ML
INJECTION, SOLUTION INTRAVENOUS CONTINUOUS
Status: DISCONTINUED | OUTPATIENT
Start: 2024-06-27 | End: 2024-06-28

## 2024-06-27 RX ORDER — MORPHINE SULFATE 4 MG/ML
4 INJECTION INTRAVENOUS ONCE
Status: COMPLETED | OUTPATIENT
Start: 2024-06-27 | End: 2024-06-27

## 2024-06-27 RX ORDER — SODIUM CHLORIDE, SODIUM LACTATE, POTASSIUM CHLORIDE, CALCIUM CHLORIDE 600; 310; 30; 20 MG/100ML; MG/100ML; MG/100ML; MG/100ML
INJECTION, SOLUTION INTRAVENOUS CONTINUOUS
Status: ACTIVE | OUTPATIENT
Start: 2024-06-27 | End: 2024-06-27

## 2024-06-27 RX ORDER — ONDANSETRON 2 MG/ML
4 INJECTION INTRAMUSCULAR; INTRAVENOUS EVERY 4 HOURS PRN
Status: DISCONTINUED | OUTPATIENT
Start: 2024-06-27 | End: 2024-06-28 | Stop reason: HOSPADM

## 2024-06-27 RX ORDER — SODIUM CHLORIDE, SODIUM LACTATE, POTASSIUM CHLORIDE, AND CALCIUM CHLORIDE .6; .31; .03; .02 G/100ML; G/100ML; G/100ML; G/100ML
30 INJECTION, SOLUTION INTRAVENOUS ONCE
Status: COMPLETED | OUTPATIENT
Start: 2024-06-27 | End: 2024-06-27

## 2024-06-27 RX ORDER — POLYETHYLENE GLYCOL 3350 17 G/17G
1 POWDER, FOR SOLUTION ORAL
Status: DISCONTINUED | OUTPATIENT
Start: 2024-06-27 | End: 2024-06-28 | Stop reason: HOSPADM

## 2024-06-27 RX ORDER — SODIUM BICARBONATE 650 MG/1
650 TABLET ORAL 3 TIMES DAILY
Status: DISCONTINUED | OUTPATIENT
Start: 2024-06-27 | End: 2024-06-28 | Stop reason: HOSPADM

## 2024-06-27 RX ORDER — PROCHLORPERAZINE EDISYLATE 5 MG/ML
5-10 INJECTION INTRAMUSCULAR; INTRAVENOUS EVERY 4 HOURS PRN
Status: DISCONTINUED | OUTPATIENT
Start: 2024-06-27 | End: 2024-06-28 | Stop reason: HOSPADM

## 2024-06-27 RX ORDER — PROMETHAZINE HYDROCHLORIDE 25 MG/1
12.5-25 SUPPOSITORY RECTAL EVERY 4 HOURS PRN
Status: DISCONTINUED | OUTPATIENT
Start: 2024-06-27 | End: 2024-06-28 | Stop reason: HOSPADM

## 2024-06-27 RX ORDER — SODIUM CHLORIDE 9 MG/ML
30 INJECTION, SOLUTION INTRAVENOUS
Status: DISCONTINUED | OUTPATIENT
Start: 2024-06-27 | End: 2024-06-27

## 2024-06-27 RX ORDER — CARVEDILOL 3.12 MG/1
3.12 TABLET ORAL 2 TIMES DAILY WITH MEALS
Status: DISCONTINUED | OUTPATIENT
Start: 2024-06-27 | End: 2024-06-28 | Stop reason: HOSPADM

## 2024-06-27 RX ORDER — OXYCODONE HYDROCHLORIDE 5 MG/1
5 TABLET ORAL
Status: DISCONTINUED | OUTPATIENT
Start: 2024-06-27 | End: 2024-06-28 | Stop reason: HOSPADM

## 2024-06-27 RX ORDER — OXYCODONE HYDROCHLORIDE 5 MG/1
2.5 TABLET ORAL
Status: DISCONTINUED | OUTPATIENT
Start: 2024-06-27 | End: 2024-06-28 | Stop reason: HOSPADM

## 2024-06-27 RX ORDER — ONDANSETRON 2 MG/ML
4 INJECTION INTRAMUSCULAR; INTRAVENOUS ONCE
Status: COMPLETED | OUTPATIENT
Start: 2024-06-27 | End: 2024-06-27

## 2024-06-27 RX ORDER — ACETAMINOPHEN 325 MG/1
650 TABLET ORAL EVERY 6 HOURS PRN
Status: DISCONTINUED | OUTPATIENT
Start: 2024-06-27 | End: 2024-06-28 | Stop reason: HOSPADM

## 2024-06-27 RX ORDER — TAMSULOSIN HYDROCHLORIDE 0.4 MG/1
0.4 CAPSULE ORAL EVERY EVENING
Status: DISCONTINUED | OUTPATIENT
Start: 2024-06-27 | End: 2024-06-28 | Stop reason: HOSPADM

## 2024-06-27 RX ADMIN — ONDANSETRON 4 MG: 2 INJECTION INTRAMUSCULAR; INTRAVENOUS at 11:25

## 2024-06-27 RX ADMIN — CARVEDILOL 3.12 MG: 3.12 TABLET, FILM COATED ORAL at 16:59

## 2024-06-27 RX ADMIN — PROMETHAZINE HYDROCHLORIDE 25 MG: 25 TABLET ORAL at 16:59

## 2024-06-27 RX ADMIN — SODIUM CHLORIDE, POTASSIUM CHLORIDE, SODIUM LACTATE AND CALCIUM CHLORIDE: 600; 310; 30; 20 INJECTION, SOLUTION INTRAVENOUS at 13:34

## 2024-06-27 RX ADMIN — MORPHINE SULFATE 4 MG: 4 INJECTION INTRAVENOUS at 11:25

## 2024-06-27 RX ADMIN — ONDANSETRON 4 MG: 2 INJECTION INTRAMUSCULAR; INTRAVENOUS at 19:39

## 2024-06-27 RX ADMIN — SODIUM CHLORIDE, POTASSIUM CHLORIDE, SODIUM LACTATE AND CALCIUM CHLORIDE 2034 ML: 600; 310; 30; 20 INJECTION, SOLUTION INTRAVENOUS at 08:38

## 2024-06-27 RX ADMIN — ONDANSETRON 4 MG: 2 INJECTION INTRAMUSCULAR; INTRAVENOUS at 15:33

## 2024-06-27 RX ADMIN — PROCHLORPERAZINE EDISYLATE 5 MG: 5 INJECTION INTRAMUSCULAR; INTRAVENOUS at 22:34

## 2024-06-27 RX ADMIN — SODIUM CHLORIDE, POTASSIUM CHLORIDE, SODIUM LACTATE AND CALCIUM CHLORIDE: 600; 310; 30; 20 INJECTION, SOLUTION INTRAVENOUS at 18:56

## 2024-06-27 SDOH — ECONOMIC STABILITY: TRANSPORTATION INSECURITY
IN THE PAST 12 MONTHS, HAS THE LACK OF TRANSPORTATION KEPT YOU FROM MEDICAL APPOINTMENTS OR FROM GETTING MEDICATIONS?: NO

## 2024-06-27 SDOH — ECONOMIC STABILITY: TRANSPORTATION INSECURITY
IN THE PAST 12 MONTHS, HAS LACK OF RELIABLE TRANSPORTATION KEPT YOU FROM MEDICAL APPOINTMENTS, MEETINGS, WORK OR FROM GETTING THINGS NEEDED FOR DAILY LIVING?: NO

## 2024-06-27 ASSESSMENT — PAIN DESCRIPTION - PAIN TYPE
TYPE: ACUTE PAIN

## 2024-06-27 ASSESSMENT — ENCOUNTER SYMPTOMS
NERVOUS/ANXIOUS: 0
EYE REDNESS: 0
STRIDOR: 0
FOCAL WEAKNESS: 0
SHORTNESS OF BREATH: 0
DIARRHEA: 1
FLANK PAIN: 0
COUGH: 0
ABDOMINAL PAIN: 1
FEVER: 0
MYALGIAS: 0
BRUISES/BLEEDS EASILY: 0
NAUSEA: 1
EYE DISCHARGE: 0
VOMITING: 1
CHILLS: 0

## 2024-06-27 ASSESSMENT — LIFESTYLE VARIABLES
HAVE YOU EVER FELT YOU SHOULD CUT DOWN ON YOUR DRINKING: NO
TOTAL SCORE: 0
AVERAGE NUMBER OF DAYS PER WEEK YOU HAVE A DRINK CONTAINING ALCOHOL: 0
EVER HAD A DRINK FIRST THING IN THE MORNING TO STEADY YOUR NERVES TO GET RID OF A HANGOVER: NO
ON A TYPICAL DAY WHEN YOU DRINK ALCOHOL HOW MANY DRINKS DO YOU HAVE: 1
EVER FELT BAD OR GUILTY ABOUT YOUR DRINKING: NO
CONSUMPTION TOTAL: NEGATIVE
HOW MANY TIMES IN THE PAST YEAR HAVE YOU HAD 5 OR MORE DRINKS IN A DAY: 0
HAVE PEOPLE ANNOYED YOU BY CRITICIZING YOUR DRINKING: NO
ALCOHOL_USE: YES

## 2024-06-27 ASSESSMENT — SOCIAL DETERMINANTS OF HEALTH (SDOH)
WITHIN THE PAST 12 MONTHS, YOU WORRIED THAT YOUR FOOD WOULD RUN OUT BEFORE YOU GOT THE MONEY TO BUY MORE: NEVER TRUE
WITHIN THE LAST YEAR, HAVE YOU BEEN KICKED, HIT, SLAPPED, OR OTHERWISE PHYSICALLY HURT BY YOUR PARTNER OR EX-PARTNER?: NO
IN THE PAST 12 MONTHS, HAS THE ELECTRIC, GAS, OIL, OR WATER COMPANY THREATENED TO SHUT OFF SERVICE IN YOUR HOME?: NO
WITHIN THE LAST YEAR, HAVE YOU BEEN HUMILIATED OR EMOTIONALLY ABUSED IN OTHER WAYS BY YOUR PARTNER OR EX-PARTNER?: NO
WITHIN THE LAST YEAR, HAVE TO BEEN RAPED OR FORCED TO HAVE ANY KIND OF SEXUAL ACTIVITY BY YOUR PARTNER OR EX-PARTNER?: NO
WITHIN THE PAST 12 MONTHS, THE FOOD YOU BOUGHT JUST DIDN'T LAST AND YOU DIDN'T HAVE MONEY TO GET MORE: NEVER TRUE
WITHIN THE LAST YEAR, HAVE YOU BEEN AFRAID OF YOUR PARTNER OR EX-PARTNER?: NO

## 2024-06-27 ASSESSMENT — FIBROSIS 4 INDEX: FIB4 SCORE: 0.67

## 2024-06-27 NOTE — ASSESSMENT & PLAN NOTE
No risk factors for C. difficile.  Likely secondary to viral enteritis  I will check for rotavirus, crypto, Giardia.  Supportive care with intravenous fluids monitor electrolytes and replace accordingly

## 2024-06-27 NOTE — ED NOTES
"  Pharmacy Medication Reconciliation      ~Medication reconciliation updated and complete per patient at bedside   ~Allergies have been verified and updated   ~No oral ABX within the last 30 days  ~Patient home pharmacy :  [facility]      ~Patient reports his wife gave him a \"low dose Klonopin\" on Tuesday evening to help calm him down. Patient is unsure of dose       ~Anticoagulants (rivaroxaban, apixaban, edoxaban, dabigatran, warfarin, enoxaparin) taken in the last 14 days? No        "

## 2024-06-27 NOTE — ASSESSMENT & PLAN NOTE
Likely due to reduced intravascular volume and increase bicarb losses through diarrhea and acute kidney injury  I will start intravenous fluids and bicarb  Anticipate improvement with intravenous fluids  Continue to monitor, I will order follow-up bicarb level

## 2024-06-27 NOTE — ASSESSMENT & PLAN NOTE
Mostly due to dehydration   I will start intravenous fluids  Avoid / minimize nephrotoxins as much as possible.  Monitor inputs and outputs  I will order bladder scan to rule out urinary retention

## 2024-06-27 NOTE — ASSESSMENT & PLAN NOTE
No known history of primary hypertension  Likely secondary to severe pain  Multimodal pain control  I will start as needed labetalol for extreme hypertension  Consider starting scheduled antihypertensive medications according to blood pressure trend

## 2024-06-27 NOTE — H&P
[facility] Medicine History & Physical Note    Date of Service  6/27/2024    Primary Care Physician  Meaghan Hensley P.A.-C.      Consultants  None     Code Status  Full Code    Chief Complaint  Chief Complaint   Patient presents with    Diarrhea     Since Monday. Pt reports 3-4 times a day.    Abdominal Pain     History of Presenting Illness  Gustavo Prieto is a 63 y.o. male with a past medical history of prostatic hyperplasia who presented 6/27/2024 with abdominal pain nausea and vomiting.  The patient reports he has not been feeling well since Monday.  He has been having multiple episodes of frequent loose bowel movements no blood or mucus.  He also has been having abdominal pain with nausea and few episodes of vomiting on the day of admission.  There is no blood in his vomitus.  He denies having fevers but reports having intermittent chills..     I discussed the plan of care with emergency physician, and the patient.    Review of Systems  Review of Systems   Constitutional:  Positive for malaise/fatigue. Negative for chills and fever.   Eyes:  Negative for discharge and redness.   Respiratory:  Negative for cough, shortness of breath and stridor.    Cardiovascular:  Negative for chest pain and leg swelling.   Gastrointestinal:  Positive for abdominal pain, diarrhea, nausea and vomiting.   Genitourinary:  Negative for flank pain.   Musculoskeletal:  Negative for myalgias.   Skin: Negative.    Neurological:  Negative for focal weakness.   Endo/Heme/Allergies:  Does not bruise/bleed easily.   Psychiatric/Behavioral:  The patient is not nervous/anxious.        Past Medical History   has a past medical history of S/P colostomy (HCC) (1/2014).    Surgical History   has a past surgical history that includes exploratory laparotomy (1/15/2014); colon resection (1/15/2014); and colostomy takedown (6/9/2014).     Family History  family history includes Diabetes in his father; Heart Attack in his father.      Social  "History   reports that he quit smoking about 10 years ago. His smoking use included cigarettes. He started smoking about 46 years ago. He has a 18.2 pack-year smoking history. He has never used smokeless tobacco. He reports current alcohol use. He reports current drug use.    Allergies  Allergies   Allergen Reactions    Ceftriaxone Itching    Ciprofloxacin Vomiting and Nausea     Pt reports \"feeling like I was on speed\"     Medications  Prior to Admission Medications   Prescriptions Last Dose Informant Patient Reported? Taking?   tamsulosin (FLOMAX) 0.4 MG capsule 6/24/2024 at PM Patient Yes No   Sig: Take 0.4 mg by mouth every evening.      Facility-Administered Medications: None     Physical Exam  Temp:  [36.1 °C (96.9 °F)-37.3 °C (99.1 °F)] 37.3 °C (99.1 °F)  Pulse:  [] 73  Resp:  [14-22] 20  BP: (143-195)/() 146/90  SpO2:  [91 %-97 %] 95 %  Blood Pressure: (!) 143/81   Temperature: 36.1 °C (96.9 °F)   Pulse: 74   Respiration: 14   Pulse Oximetry: 91 %     Physical Exam  Constitutional:       General: He is not in acute distress.     Appearance: He is not ill-appearing or diaphoretic.   HENT:      Head: Atraumatic.      Right Ear: External ear normal.      Left Ear: External ear normal.      Nose: No congestion or rhinorrhea.      Mouth/Throat:      Mouth: Mucous membranes are dry.   Eyes:      General: No scleral icterus.        Right eye: No discharge.         Left eye: No discharge.      Pupils: Pupils are equal, round, and reactive to light.   Cardiovascular:      Rate and Rhythm: Regular rhythm. Tachycardia present.   Pulmonary:      Effort: Pulmonary effort is normal.   Abdominal:      General: There is no distension.   Musculoskeletal:      Cervical back: Neck supple. No rigidity. No muscular tenderness.      Right lower leg: No edema.      Left lower leg: No edema.   Skin:     General: Skin is dry.      Capillary Refill: Capillary refill takes 2 to 3 seconds.      Coloration: Skin is not " "jaundiced or pale.   Neurological:      Mental Status: He is alert and oriented to person, place, and time.      Coordination: Coordination normal.   Psychiatric:         Mood and Affect: Mood normal.         Behavior: Behavior normal.       Laboratory:  Recent Labs     06/27/24  0746   WBC 14.0*   RBC 6.35*   HEMOGLOBIN 18.6*   HEMATOCRIT 54.1*   MCV 85.2   MCH 29.3   MCHC 34.4   RDW 47.7   PLATELETCT 414   MPV 10.0     Recent Labs     06/27/24  0746   SODIUM 137   POTASSIUM 4.7   CHLORIDE 102   CO2 14*   GLUCOSE 166*   BUN 22   CREATININE 1.46*   CALCIUM 10.6*     Recent Labs     06/27/24  0746   ALTSGPT 21   ASTSGOT 15   ALKPHOSPHAT 118*   TBILIRUBIN 1.0   LIPASE 24   GLUCOSE 166*         No results for input(s): \"NTPROBNP\" in the last 72 hours.      No results for input(s): \"TROPONINT\" in the last 72 hours.    Imaging:  CT-ABDOMEN-PELVIS W/O   Final Result      1.  No bowel obstruction or perforation: Normal appendix.   2.  No renal stone or hydronephrosis.   3.  Mild heterogeneous fatty infiltration of liver.   4.  Prominent multinodular adrenal glands again seen.      DX-CHEST-PORTABLE (1 VIEW)   Final Result      No acute cardiopulmonary disease.        I personally reviewed patient EKG shows sinus rhythm with a rate of 69.  There is no ST elevation.  There is flattening of T waves in lead III and aVF.  QTc is 467.     Assessment/Plan:  Justification for Admission Status  I anticipate this patient is appropriate for observation status at this time because patient has severe dehydration and lactic acidosis.  Anticipate improvement intravenous fluids.  Likely discharge from now.    Patient will need a Med/Surg bed on MEDICAL service     * JAG (acute kidney injury) (HCC)- (present on admission)  Assessment & Plan  Mostly due to dehydration   I will start intravenous fluids  Avoid / minimize nephrotoxins as much as possible.  Monitor inputs and outputs  I will order bladder scan to rule out urinary retention "     Hypertensive urgency- (present on admission)  Assessment & Plan  No known history of primary hypertension  Likely secondary to severe pain  Multimodal pain control  I will start as needed labetalol for extreme hypertension  Consider starting scheduled antihypertensive medications according to blood pressure trend      AP (abdominal pain)- (present on admission)  Assessment & Plan  Likely secondary to viral enteritis.  Will check CT abdomen for further evaluation    Diarrhea- (present on admission)  Assessment & Plan  No risk factors for C. difficile.  Likely secondary to viral enteritis  I will check for rotavirus, crypto, Giardia.  Supportive care with intravenous fluids monitor electrolytes and replace accordingly      Dehydration- (present on admission)  Assessment & Plan  Likely secondary to reduced oral intake, and increase in losses secondary to diarrhea  Encourage oral intake as tolerated, antiemetics as needed.  Intravenous hydration until adequate oral intake is achieved     Polycythemia- (present on admission)  Assessment & Plan  Likely secondary to severe dehydration  I will start intravenous fluids  I anticipate improvement with intravenous fluids  I will order a follow-up CBC     Metabolic acidosis- (present on admission)  Assessment & Plan  Likely due to reduced intravascular volume and increase bicarb losses through diarrhea and acute kidney injury  I will start intravenous fluids and bicarb  Anticipate improvement with intravenous fluids  Continue to monitor, I will order follow-up bicarb level          VTE prophylaxis: SCDs/TEDs and enoxaparin ppx

## 2024-06-27 NOTE — ASSESSMENT & PLAN NOTE
Likely secondary to reduced oral intake, and increase in losses secondary to diarrhea  Encourage oral intake as tolerated, antiemetics as needed.  Intravenous hydration until adequate oral intake is achieved

## 2024-06-27 NOTE — ASSESSMENT & PLAN NOTE
Likely secondary to severe dehydration  I will start intravenous fluids  I anticipate improvement with intravenous fluids  I will order a follow-up CBC

## 2024-06-27 NOTE — ED TRIAGE NOTES
"Chief Complaint   Patient presents with    Diarrhea     Since Monday. Pt reports 3-4 times a day.    Abdominal Pain       Pt ambulatory to triage for above complaint. Pt unable to keep any liquids or solid foods down. Appears uncomfortable in triage.    Placed at phlebotomy. Educated on triage process. Encouraged to alert staff to any changes.    FSBG 145    A+Ox4, GCS 15    BP (!) 175/113   Pulse (!) 101   Temp 36.1 °C (96.9 °F) (Temporal)   Resp (!) 22   Ht 1.753 m (5' 9\")   Wt 67.8 kg (149 lb 7.6 oz)   SpO2 96%   BMI 22.07 kg/m²     "

## 2024-06-27 NOTE — ED PROVIDER NOTES
ED Provider Note    CHIEF COMPLAINT  Chief Complaint   Patient presents with    Diarrhea     Since Monday. Pt reports 3-4 times a day.    Abdominal Pain       EXTERNAL RECORDS REVIEWED  Reviewed based on labs and previous hospitalization and imaging and lab reports.    HPI/ROS  LIMITATION TO HISTORY   Select: : None  OUTSIDE HISTORIAN(S):  None    Gustavo Prieto is a 63 y.o. male who presents to the emergency room complaining of diarrhea.  The patient's had several days of copious watery diarrhea.  This is nonbloody not mucousy diarrhea.  The patient had nausea but no vomiting.  He has diffuse pain mostly in the left side.  Patient states he comes in today because he is no longer urinating.  Has intermittent subjective fevers and chills.  No chest pain cough or shortness of breath.  No difficulty urinating other than decreased urine output.  Denies any foreign travel or sick contacts or other acute concerns or complaints.    PAST MEDICAL HISTORY   has a past medical history of S/P colostomy (HCC) (1/2014).    SURGICAL HISTORY   has a past surgical history that includes exploratory laparotomy (1/15/2014); colon resection (1/15/2014); and colostomy takedown (6/9/2014).    FAMILY HISTORY  Family History   Problem Relation Age of Onset    Diabetes Father     Heart Attack Father        SOCIAL HISTORY  Social History     Tobacco Use    Smoking status: Former     Current packs/day: 0.00     Average packs/day: 0.5 packs/day for 36.5 years (18.2 ttl pk-yrs)     Types: Cigarettes     Start date: 8/1/1977     Quit date: 1/20/2014     Years since quitting: 10.4    Smokeless tobacco: Never   Substance and Sexual Activity    Alcohol use: Yes     Comment: occ    Drug use: Yes     Comment: marijuana    Sexual activity: Not on file       CURRENT MEDICATIONS  Home Medications       Reviewed by Wali Tan R.N. (Registered Nurse) on 06/27/24 at 0661  Med List Status: Partial     Medication Last Dose Status   omeprazole  "(PRILOSEC) 20 MG delayed-release capsule  Active   promethazine (PHENERGAN) 12.5 MG Suppos  Active   rosuvastatin (CRESTOR) 10 MG Tab  Active   tamsulosin (FLOMAX) 0.4 MG capsule  Active   vitamin D2, Ergocalciferol, (DRISDOL) 1.25 MG (20107 UT) Cap capsule  Active                    ALLERGIES  Allergies   Allergen Reactions    Ciprofloxacin      Pt reports \"feeling like I was on speed\"    Rocephin [Ceftriaxone] Itching       PHYSICAL EXAM  VITAL SIGNS: BP (!) 175/113   Pulse (!) 101   Temp 36.1 °C (96.9 °F) (Temporal)   Resp (!) 22   Ht 1.753 m (5' 9\")   Wt 67.8 kg (149 lb 7.6 oz)   SpO2 96%   BMI 22.07 kg/m²    Constitutional: Awake, alert, tachypneic ill-appearing uncomfortable appearing in moderate distress  HENT: Normocephalic, Atraumatic  Eyes: PERRL, EOMI, Conjunctiva normal, No discharge.   Neck: Normal range of motion,  Cardiovascular: Tachycardic no murmurs rubs or gallops  Thorax & Lungs: Normal breath sounds, No respiratory distress, No wheezing,   Abdomen: Midline surgical incision is well-healed the scar.  He is diffusely tender more tender on the left.  Old colostomy scar.  No peritonitis.  I do not appreciate a distended bladder.  Skin: Warm, Dry, No erythema, No rash.  Musculoskeletal: Good range of motion in all major joints.  Neurologic: Alert, No focal deficits noted.   Psychiatric: Affect normal      EKG/LABS  Results for orders placed or performed during the hospital encounter of 06/27/24   CBC WITH DIFFERENTIAL   Result Value Ref Range    WBC 14.0 (H) 4.8 - 10.8 K/uL    RBC 6.35 (H) 4.70 - 6.10 M/uL    Hemoglobin 18.6 (H) 14.0 - 18.0 g/dL    Hematocrit 54.1 (H) 42.0 - 52.0 %    MCV 85.2 81.4 - 97.8 fL    MCH 29.3 27.0 - 33.0 pg    MCHC 34.4 32.3 - 36.5 g/dL    RDW 47.7 35.9 - 50.0 fL    Platelet Count 414 164 - 446 K/uL    MPV 10.0 9.0 - 12.9 fL    Neutrophils-Polys 79.40 (H) 44.00 - 72.00 %    Lymphocytes 14.00 (L) 22.00 - 41.00 %    Monocytes 6.00 0.00 - 13.40 %    Eosinophils 0.00 " 0.00 - 6.90 %    Basophils 0.20 0.00 - 1.80 %    Immature Granulocytes 0.40 0.00 - 0.90 %    Nucleated RBC 0.00 0.00 - 0.20 /100 WBC    Neutrophils (Absolute) 11.09 (H) 1.82 - 7.42 K/uL    Lymphs (Absolute) 1.95 1.00 - 4.80 K/uL    Monos (Absolute) 0.84 0.00 - 0.85 K/uL    Eos (Absolute) 0.00 0.00 - 0.51 K/uL    Baso (Absolute) 0.03 0.00 - 0.12 K/uL    Immature Granulocytes (abs) 0.06 0.00 - 0.11 K/uL    NRBC (Absolute) 0.00 K/uL   COMP METABOLIC PANEL   Result Value Ref Range    Sodium 137 135 - 145 mmol/L    Potassium 4.7 3.6 - 5.5 mmol/L    Chloride 102 96 - 112 mmol/L    Co2 14 (L) 20 - 33 mmol/L    Anion Gap 21.0 (H) 7.0 - 16.0    Glucose 166 (H) 65 - 99 mg/dL    Bun 22 8 - 22 mg/dL    Creatinine 1.46 (H) 0.50 - 1.40 mg/dL    Calcium 10.6 (H) 8.5 - 10.5 mg/dL    Correct Calcium 9.7 8.5 - 10.5 mg/dL    AST(SGOT) 15 12 - 45 U/L    ALT(SGPT) 21 2 - 50 U/L    Alkaline Phosphatase 118 (H) 30 - 99 U/L    Total Bilirubin 1.0 0.1 - 1.5 mg/dL    Albumin 5.1 (H) 3.2 - 4.9 g/dL    Total Protein 8.3 (H) 6.0 - 8.2 g/dL    Globulin 3.2 1.9 - 3.5 g/dL    A-G Ratio 1.6 g/dL   LIPASE   Result Value Ref Range    Lipase 24 11 - 82 U/L   Lactic Acid   Result Value Ref Range    Lactic Acid 5.7 (HH) 0.5 - 2.0 mmol/L   ESTIMATED GFR   Result Value Ref Range    GFR (CKD-EPI) 54 (A) >60 mL/min/1.73 m 2   POCT glucose device results   Result Value Ref Range    POC Glucose, Blood 163 (H) 65 - 99 mg/dL      I have independently interpreted this EKG    RADIOLOGY/PROCEDURES   I have independently interpreted the diagnostic imaging associated with this visit and am waiting the final reading from the radiologist.   My preliminary interpretation is as follows: I reviewed the CT    Radiologist interpretation:  CT-ABDOMEN-PELVIS W/O   Final Result      1.  No bowel obstruction or perforation: Normal appendix.   2.  No renal stone or hydronephrosis.   3.  Mild heterogeneous fatty infiltration of liver.   4.  Prominent multinodular adrenal glands  again seen.      DX-CHEST-PORTABLE (1 VIEW)   Final Result      No acute cardiopulmonary disease.          COURSE & MEDICAL DECISION MAKING    ASSESSMENT, COURSE AND PLAN  Care Narrative:     Patient presents to the emergency department with copious amounts of diarrhea.  Differential diagnosis clues but is not limited to.  Colitis, gastroenteritis, ischemic colitis, diverticulosis, as well as metabolic complications of fluid losses like acute renal failure, dehydration.    Patient is criteria for septic protocol this was initiated with fluids and labs.    Hydration: Based on the patient's presentation of Abnormal Fluid Loss and Acute Diarrhea the patient was given IV fluids. IV Hydration was used because oral hydration was not adequate alone. Upon recheck following hydration, the patient was improved.  Sepsis: Infection was suspected 805 (Time). Sepsis pathway was initiated. 30cc/kg bolus given. Antibiotics were given per protocol.        Patient's workup with labs and imaging.  His imaging is unremarkable.  His labs show lactic acidosis and a metabolic acidosis.  He does not have peritonitis.  Will hydrate him given some fluids with some sugar and repeat his labs.  He does have JAG but is not far from his baseline.      The patient was resuscitated with fluids.  After this his lactic was repeated and it remained unchanged at 5.7.  After pain meds and antiemetics and still vomiting not tolerating fluids.  His abdominal exam is  without peritonitis.  He otherwise looks okay.  I think it was persistent lab abnormality and inability criteria in the ED after a couple of liters will need hospitalization for excess fluid losses and acidosis.  Suspect she has low bicarb secondary to diarrhea.  Because he is not improving enough to tolerate fluids and be discharged home he will be hospitalized further workup and treatment.  Discussed case with the hospitalist and care transferred at that time.      ADDITIONAL  PROBLEMS MANAGED    Diabetes.  Total abdominal operations.    DISPOSITION AND DISCUSSIONS  I have discussed management of the patient with the following physicians and [facility]        Decision tools and prescription drugs considered including, but not limited to: Septic protocol initiated.    FINAL DIAGNOSIS  1. Nausea and vomiting, unspecified vomiting type    2. Diarrhea, unspecified type    3. Dehydration    4. JAG (acute kidney injury) (HCC)           Electronically signed by: Eduard Vizcaino M.D., 6/27/2024 8:05 AM

## 2024-06-28 ENCOUNTER — PHARMACY VISIT (OUTPATIENT)
Dept: PHARMACY | Facility: MEDICAL CENTER | Age: 64
End: 2024-06-28
Payer: COMMERCIAL

## 2024-06-28 VITALS
RESPIRATION RATE: 16 BRPM | SYSTOLIC BLOOD PRESSURE: 132 MMHG | BODY MASS INDEX: 22.14 KG/M2 | HEIGHT: 69 IN | HEART RATE: 80 BPM | DIASTOLIC BLOOD PRESSURE: 80 MMHG | OXYGEN SATURATION: 96 % | WEIGHT: 149.47 LBS | TEMPERATURE: 98.2 F

## 2024-06-28 PROBLEM — I16.0 HYPERTENSIVE URGENCY: Status: RESOLVED | Noted: 2024-06-27 | Resolved: 2024-06-28

## 2024-06-28 PROBLEM — N17.9 AKI (ACUTE KIDNEY INJURY) (HCC): Status: RESOLVED | Noted: 2022-03-10 | Resolved: 2024-06-28

## 2024-06-28 PROBLEM — R10.9 AP (ABDOMINAL PAIN): Status: RESOLVED | Noted: 2024-06-27 | Resolved: 2024-06-28

## 2024-06-28 PROBLEM — E86.0 DEHYDRATION: Status: RESOLVED | Noted: 2022-03-09 | Resolved: 2024-06-28

## 2024-06-28 PROBLEM — R19.7 DIARRHEA: Status: RESOLVED | Noted: 2024-06-27 | Resolved: 2024-06-28

## 2024-06-28 PROBLEM — E87.20 METABOLIC ACIDOSIS: Status: RESOLVED | Noted: 2023-06-28 | Resolved: 2024-06-28

## 2024-06-28 PROBLEM — D75.1 POLYCYTHEMIA: Status: RESOLVED | Noted: 2024-06-27 | Resolved: 2024-06-28

## 2024-06-28 LAB
ALBUMIN SERPL BCP-MCNC: 4.2 G/DL (ref 3.2–4.9)
ALBUMIN/GLOB SERPL: 1.6 G/DL
ALP SERPL-CCNC: 95 U/L (ref 30–99)
ALT SERPL-CCNC: 15 U/L (ref 2–50)
ANION GAP SERPL CALC-SCNC: 14 MMOL/L (ref 7–16)
AST SERPL-CCNC: 19 U/L (ref 12–45)
BACTERIA BLD CULT: NORMAL
BACTERIA BLD CULT: NORMAL
BILIRUB SERPL-MCNC: 1.2 MG/DL (ref 0.1–1.5)
BUN SERPL-MCNC: 16 MG/DL (ref 8–22)
CALCIUM ALBUM COR SERPL-MCNC: 9.3 MG/DL (ref 8.5–10.5)
CALCIUM SERPL-MCNC: 9.5 MG/DL (ref 8.5–10.5)
CHLORIDE SERPL-SCNC: 103 MMOL/L (ref 96–112)
CO2 SERPL-SCNC: 20 MMOL/L (ref 20–33)
CREAT SERPL-MCNC: 0.98 MG/DL (ref 0.5–1.4)
EKG IMPRESSION: NORMAL
ERYTHROCYTE [DISTWIDTH] IN BLOOD BY AUTOMATED COUNT: 49.5 FL (ref 35.9–50)
GFR SERPLBLD CREATININE-BSD FMLA CKD-EPI: 87 ML/MIN/1.73 M 2
GLOBULIN SER CALC-MCNC: 2.7 G/DL (ref 1.9–3.5)
GLUCOSE SERPL-MCNC: 109 MG/DL (ref 65–99)
HCT VFR BLD AUTO: 50.1 % (ref 42–52)
HGB BLD-MCNC: 16.3 G/DL (ref 14–18)
LACTATE SERPL-SCNC: 1.7 MMOL/L (ref 0.5–2)
MAGNESIUM SERPL-MCNC: 2 MG/DL (ref 1.5–2.5)
MCH RBC QN AUTO: 28.8 PG (ref 27–33)
MCHC RBC AUTO-ENTMCNC: 32.5 G/DL (ref 32.3–36.5)
MCV RBC AUTO: 88.7 FL (ref 81.4–97.8)
PLATELET # BLD AUTO: 338 K/UL (ref 164–446)
PMV BLD AUTO: 10.1 FL (ref 9–12.9)
POTASSIUM SERPL-SCNC: 4.1 MMOL/L (ref 3.6–5.5)
PROT SERPL-MCNC: 6.9 G/DL (ref 6–8.2)
RBC # BLD AUTO: 5.65 M/UL (ref 4.7–6.1)
RV AG STL QL IA: NEGATIVE
SIGNIFICANT IND 70042: NORMAL
SIGNIFICANT IND 70042: NORMAL
SITE SITE: NORMAL
SITE SITE: NORMAL
SODIUM SERPL-SCNC: 137 MMOL/L (ref 135–145)
SOURCE SOURCE: NORMAL
SOURCE SOURCE: NORMAL
WBC # BLD AUTO: 14.9 K/UL (ref 4.8–10.8)

## 2024-06-28 PROCEDURE — A9270 NON-COVERED ITEM OR SERVICE: HCPCS

## 2024-06-28 PROCEDURE — 700102 HCHG RX REV CODE 250 W/ 637 OVERRIDE(OP): Performed by: HOSPITALIST

## 2024-06-28 PROCEDURE — 700111 HCHG RX REV CODE 636 W/ 250 OVERRIDE (IP): Mod: JZ | Performed by: HOSPITALIST

## 2024-06-28 PROCEDURE — RXMED WILLOW AMBULATORY MEDICATION CHARGE

## 2024-06-28 PROCEDURE — 93010 ELECTROCARDIOGRAM REPORT: CPT | Performed by: INTERNAL MEDICINE

## 2024-06-28 PROCEDURE — 85027 COMPLETE CBC AUTOMATED: CPT

## 2024-06-28 PROCEDURE — 83605 ASSAY OF LACTIC ACID: CPT

## 2024-06-28 PROCEDURE — 83735 ASSAY OF MAGNESIUM: CPT

## 2024-06-28 PROCEDURE — 96376 TX/PRO/DX INJ SAME DRUG ADON: CPT

## 2024-06-28 PROCEDURE — 700105 HCHG RX REV CODE 258

## 2024-06-28 PROCEDURE — 700102 HCHG RX REV CODE 250 W/ 637 OVERRIDE(OP)

## 2024-06-28 PROCEDURE — G0378 HOSPITAL OBSERVATION PER HR: HCPCS

## 2024-06-28 PROCEDURE — 80053 COMPREHEN METABOLIC PANEL: CPT

## 2024-06-28 PROCEDURE — 93005 ELECTROCARDIOGRAM TRACING: CPT

## 2024-06-28 PROCEDURE — 99239 HOSP IP/OBS DSCHRG MGMT >30: CPT | Mod: FS | Performed by: STUDENT IN AN ORGANIZED HEALTH CARE EDUCATION/TRAINING PROGRAM

## 2024-06-28 PROCEDURE — A9270 NON-COVERED ITEM OR SERVICE: HCPCS | Performed by: HOSPITALIST

## 2024-06-28 RX ORDER — AMLODIPINE BESYLATE 2.5 MG/1
2.5 TABLET ORAL DAILY
Qty: 30 TABLET | Refills: 0 | Status: SHIPPED | OUTPATIENT
Start: 2024-06-28 | End: 2024-07-28

## 2024-06-28 RX ORDER — ONDANSETRON 4 MG/1
4 TABLET, ORALLY DISINTEGRATING ORAL EVERY 6 HOURS PRN
Qty: 12 TABLET | Refills: 0 | Status: SHIPPED | OUTPATIENT
Start: 2024-06-28 | End: 2024-07-01

## 2024-06-28 RX ORDER — SODIUM CHLORIDE, SODIUM LACTATE, POTASSIUM CHLORIDE, CALCIUM CHLORIDE 600; 310; 30; 20 MG/100ML; MG/100ML; MG/100ML; MG/100ML
INJECTION, SOLUTION INTRAVENOUS CONTINUOUS
Status: DISCONTINUED | OUTPATIENT
Start: 2024-06-28 | End: 2024-06-28 | Stop reason: HOSPADM

## 2024-06-28 RX ORDER — SODIUM CHLORIDE, SODIUM LACTATE, POTASSIUM CHLORIDE, CALCIUM CHLORIDE 600; 310; 30; 20 MG/100ML; MG/100ML; MG/100ML; MG/100ML
INJECTION, SOLUTION INTRAVENOUS CONTINUOUS
Status: DISCONTINUED | OUTPATIENT
Start: 2024-06-28 | End: 2024-06-28

## 2024-06-28 RX ORDER — OMEPRAZOLE 20 MG/1
20 CAPSULE, DELAYED RELEASE ORAL DAILY
Qty: 30 CAPSULE | Refills: 0 | Status: SHIPPED | OUTPATIENT
Start: 2024-06-28 | End: 2024-07-28

## 2024-06-28 RX ADMIN — ONDANSETRON 4 MG: 2 INJECTION INTRAMUSCULAR; INTRAVENOUS at 08:15

## 2024-06-28 RX ADMIN — SODIUM CHLORIDE, POTASSIUM CHLORIDE, SODIUM LACTATE AND CALCIUM CHLORIDE: 600; 310; 30; 20 INJECTION, SOLUTION INTRAVENOUS at 10:50

## 2024-06-28 RX ADMIN — ONDANSETRON 4 MG: 2 INJECTION INTRAMUSCULAR; INTRAVENOUS at 02:39

## 2024-06-28 RX ADMIN — LIDOCAINE HYDROCHLORIDE 30 ML: 20 SOLUTION ORAL; TOPICAL at 09:25

## 2024-06-28 RX ADMIN — CARVEDILOL 3.12 MG: 3.12 TABLET, FILM COATED ORAL at 09:25

## 2024-06-28 RX ADMIN — PROMETHAZINE HYDROCHLORIDE 25 MG: 25 TABLET ORAL at 10:50

## 2024-06-28 RX ADMIN — SODIUM BICARBONATE 650 MG: 650 TABLET ORAL at 09:25

## 2024-06-28 ASSESSMENT — PAIN DESCRIPTION - PAIN TYPE: TYPE: ACUTE PAIN

## 2024-06-28 NOTE — DISCHARGE SUMMARY
Discharge Summary    CHIEF COMPLAINT ON ADMISSION  Chief Complaint   Patient presents with    Diarrhea     Since Monday. Pt reports 3-4 times a day.    Abdominal Pain       Reason for Admission  Diarrhea; trouble urinating     Admission Date  6/27/2024    CODE STATUS  Full Code    HPI & HOSPITAL COURSE  Gustavo Prieto is a 63 y.o. male with a past medical history of prostatic hyperplasia who presented 6/27/2024 with abdominal pain, nausea, and vomiting.      The patient reports he has not been feeling well since Monday. He has been having multiple episodes of frequent loose bowel movements no blood or mucus. He also has been having abdominal pain with nausea and few episodes of vomiting on the day of admission.  There is no blood in his vomitus.  He denies having fevers but reports having intermittent chills.    In the emergency department vital signs secondary to her heart rate 101 and blood pressure 175/113.  Labs significant for WBC 14.0, CO2 14, anion gap 21, creatinine 1.46, lactic acid 5.7.  Chest x-ray showed no acute cardiopulmonary disease.  CT abdomen pelvis without normal.    Labs this a.m. are showing his acute kidney injury resolved.  He still is having some leukocytosis with a white blood cell count of 14.9 which is likely reactive as no other signs of infection are present.  Blood pressure is well-controlled and patient able to tolerate full liquid diet with no nausea, vomiting, or abdominal pain.  Discussed with patient that we would send him home on omeprazole for further management of his GERD like symptoms in addition to Zofran for symptom control.  Additionally he will be going home on Norvasc for further management of his hypertensive urgency.  Patient is aware to follow-up with his primary care provider for further management these prescriptions.    Patient expresses understanding.  All questions today at this time.  Patient okay to discharge.    Therefore, he is discharged in good and  stable condition to home with close outpatient follow-up.    The patient recovered much more quickly than anticipated on admission.    Discharge Date  06/28/24    FOLLOW UP ITEMS POST DISCHARGE  Discharge Instructions per EILEEN Taylor.     -You are being prescribed Zofran for further symptom management if recurrence of nausea occurs.  -You are being prescribed omeprazole for further management of recovered.  -You are being prescribed amlodipine for further management of your hypertension.  Please take blood pressure medication at the same time each day and check your blood pressure daily.  -Follow-up with your primary care provider status post hospitalization.       DIET: As tolerated     ACTIVITY: As tolerated     DIAGNOSIS: Acute kidney injury     Return to ER if you start experiencing numbness and tingling, chest pain, palpitations, shortness of breath.    DISCHARGE DIAGNOSES  Principal Problem (Resolved):    JAG (acute kidney injury) (HCC) (POA: Yes)  Active Problems:    * No active hospital problems. *  Resolved Problems:    Dehydration (POA: Yes)    Metabolic acidosis (POA: Yes)    Diarrhea (POA: Yes)    AP (abdominal pain) (POA: Yes)    Hypertensive urgency (POA: Yes)    Polycythemia (POA: Yes)      FOLLOW UP  JENNIFER Knight-FOZIA  [address] [address] [phone]    Call in 1 day(s)      MEDICATIONS ON DISCHARGE     Medication List        START taking these medications        Instructions   amLODIPine 2.5 MG Tabs  Commonly known as: Norvasc   Take 1 Tablet by mouth every day for 30 days.  Dose: 2.5 mg     omeprazole 20 MG delayed-release capsule  Commonly known as: PriLOSEC   Take 1 Capsule by mouth every day for 30 days.  Dose: 20 mg     ondansetron 4 MG Tbdp  Commonly known as: Zofran ODT   Take 1 Tablet by mouth every 6 hours as needed for Nausea/Vomiting for up to 3 days.  Dose: 4 mg            CONTINUE taking these medications        Instructions  "  tamsulosin 0.4 MG capsule  Commonly known as: Flomax   Take 0.4 mg by mouth every evening.  Dose: 0.4 mg              Allergies  Allergies   Allergen Reactions    Ceftriaxone Itching    Ciprofloxacin Vomiting and Nausea     Pt reports \"feeling like I was on speed\"       DIET  Orders Placed This Encounter   Procedures    Diet Order Diet: Full Liquid     Standing Status:   Standing     Number of Occurrences:   1     Order Specific Question:   Diet:     Answer:   Full Liquid [11]       ACTIVITY  As tolerated.  Weight bearing as tolerated    CONSULTATIONS  None    PROCEDURES  None    LABORATORY  Lab Results   Component Value Date    SODIUM 137 06/28/2024    POTASSIUM 4.1 06/28/2024    CHLORIDE 103 06/28/2024    CO2 20 06/28/2024    GLUCOSE 109 (H) 06/28/2024    BUN 16 06/28/2024    CREATININE 0.98 06/28/2024        Lab Results   Component Value Date    WBC 14.9 (H) 06/28/2024    HEMOGLOBIN 16.3 06/28/2024    HEMATOCRIT 50.1 06/28/2024    PLATELETCT 338 06/28/2024        IJackie A.P.R.N. performed a substantiated portion of the service face-to-face with same patient on the same date of service INDEPENDENTLY FROM THE MD ON ASSESSMENT, EXAMINATION, AND DISCUSSION IN PLAN OF CARE FOR 19 MINUTES.  I was personally involved in reviewing and conducting the medical decision making, including the information as described below:         "

## 2024-06-28 NOTE — DISCHARGE INSTRUCTIONS
Discharge Instructions per EILEEN Taylor.     -You are being prescribed Zofran for further symptom management if recurrence of nausea occurs.  -You are being prescribed omeprazole for further management of recovered.  -You are being prescribed amlodipine for further management of your hypertension.  Please take blood pressure medication at the same time each day and check your blood pressure daily.  -Follow-up with your primary care provider status post hospitalization.       DIET: As tolerated     ACTIVITY: As tolerated     DIAGNOSIS: Acute kidney injury     Return to ER if you start experiencing numbness and tingling, chest pain, palpitations, shortness of breath.    Acute Kidney Injury, Adult  Acute kidney injury is a sudden worsening of kidney function. The kidneys are a pair of organs that do many important jobs in the body, including:  Make urine.  Make hormones.  Keep the right amount of fluids and chemicals in the body.  This condition ranges from mild to severe. Over time, it may develop into long-lasting (chronic) kidney disease. Finding it and treating it early may keep it from becoming a long-lasting disease.  What are the causes?  Common causes of this condition include:  A problem with blood flow to the kidneys. This may be caused by:  Low blood pressure or shock.  Blood loss.  Heart and blood vessel disease.  Severe burns.  Liver disease.  Direct damage to the kidneys. This may be caused by:  Certain medicines.  A kidney infection.  Poisoning.  Being around or in contact with toxic substances.  A wound from surgery.  A hard, direct hit to the kidney area.  A sudden block in urine flow. This may be caused by:  Cancer.  Kidney stones.  An enlarged prostate.  What increases the risk?  Being older than age 65.  Being female.  Being in the hospital. This is especially true if you are very sick.  Having certain conditions, such as:  Long-lasting kidney or liver disease.  Diabetes.  Heart disease  and heart failure.  Lung disease.  What are the signs or symptoms?  This condition may not cause symptoms until it becomes severe. Symptoms can include:  Feeling very tired or having trouble staying awake.  Nausea or vomiting.  Swelling (edema) of the face, legs, ankles, or feet.  Pain in the belly or pain along the side of your stomach (flank).  Urine changes, such as:  Making little or no urine.  Passing urine with a weak flow.  Muscle twitches and cramps, most often in the legs.  Confusion or trouble concentrating.  Not feeling the urge to eat.  Fever.  How is this diagnosed?  This condition may be diagnosed based on:  Your symptoms.  Your medical history.  A physical exam.  You may have other tests, such as:  Blood tests.  Urine tests.  Imaging tests.  A kidney biopsy. This involves removing a sample of kidney tissue to be looked at under a microscope.  How is this treated?  Treatment depends on the cause and how severe the condition is. In mild cases, treatment may not be needed. The kidneys may heal on their own.  In severe cases, treatment may include:  Treating the cause of the kidney injury. This may mean that you have to change your medicines or the doses you take.  Getting fluids through an IV tube.  Having a small, thin tube (catheter) put in. This tube will drain urine and prevent blockages.  Trying to keep problems from starting. This may mean not using certain medicines or not having tests done that could cause more kidney injury.  In some cases, these treatments are also needed:  Dialysis or continuous renal replacement therapy (CRRT). This treatment uses a machine to do the job of the kidneys.  Surgery. This may be done to repair a damaged kidney. It could also be done to remove a blockage in the urinary tract.  Follow these instructions at home:  Medicines  Take over-the-counter and prescription medicines only as told by your health care provider.  Do not take any new medicines unless approved by  your health care provider. Many medicines can make kidney damage worse.  Do not take any vitamin or mineral supplements unless approved by your health care provider. Some of these can make kidney damage worse.  Lifestyle    Make changes to your diet as told by your health care provider. You may need to eat less protein.  Get to, and stay at, a healthy weight. If you need help, ask your health care provider.  Start or keep up an exercise plan. Exercise at least 30 minutes a day, 5 days a week.  Do not smoke or use any products that contain nicotine or tobacco. If you need help quitting, ask your health care provider.  General instructions    Keep track of your blood pressure. Tell your health care provider if you notice any changes.  Keep your vaccines up to date. Ask your health care provider which vaccines you need.  Keep all follow-up visits.  Where to find more information  American Association of Kidney Patients: www.aakp.org  National Kidney Foundation: www.kidney.org  [employer] Kidney [employer]: www.akfinc.org  Medical Education [employer]:  [employer]: www.lifeoptions.org  Kidney [employer]: www.kidneyschool.org  Contact a health care provider if:  Your symptoms get worse.  You have new symptoms such as:  Headaches.  Skin that is darker or lighter than normal.  Easy bruising.  Itchiness.  Hiccups.  Lack of menstrual periods.  You have a fever.  Get help right away if:  You have symptoms of worsening kidney disease, such as:  Chest pain.  Shortness of breath.  Seizures.  Confusion or trouble thinking.  Belly or back pain.  You have pain or bleeding when you pass urine.  You are making little or no urine.  These symptoms may be an emergency. Get help right away. Call 911.  Do not wait to see if the symptoms will go away.  Do not drive yourself to the hospital.  Summary  Acute kidney injury is a sudden worsening of kidney function.  This condition can be caused by problems with blood flow to the kidneys, damage to the  kidneys, or a sudden block in urine flow.  This condition may not cause symptoms until it becomes severe.  Acute kidney injury can be diagnosed with blood tests, urine tests, imaging tests, and other tests.  Treatment depends on the cause and how severe the condition is.  This information is not intended to replace advice given to you by your health care provider. Make sure you discuss any questions you have with your health care provider.  Document Revised: 03/27/2023 Document Reviewed: 10/27/2020  Elsevier Patient Education © 2023 [employer].

## 2024-06-28 NOTE — HOSPITAL COURSE
Gustavo Prieto is a 63 y.o. male with a past medical history of prostatic hyperplasia who presented 6/27/2024 with abdominal pain, nausea, and vomiting.      The patient reports he has not been feeling well since Monday. He has been having multiple episodes of frequent loose bowel movements no blood or mucus. He also has been having abdominal pain with nausea and few episodes of vomiting on the day of admission.  There is no blood in his vomitus.  He denies having fevers but reports having intermittent chills.    In the emergency department vital signs secondary to her heart rate 101 and blood pressure 175/113.  Labs significant for WBC 14.0, CO2 14, anion gap 21, creatinine 1.46, lactic acid 5.7.  Chest x-ray showed no acute cardiopulmonary disease.  CT abdomen pelvis without normal.    Labs this a.m. are showing his acute kidney injury resolved.  He still is having some leukocytosis with a white blood cell count of 14.9 which is likely reactive as no other signs of infection are present.  Blood pressure is well-controlled and patient able to tolerate full liquid diet with no nausea, vomiting, or abdominal pain.  Discussed with patient that we would send him home on omeprazole for further management of his GERD like symptoms in addition to Zofran for symptom control.  Additionally he will be going home on Norvasc for further management of his hypertensive urgency.  Patient is aware to follow-up with his primary care provider for further management these prescriptions.    Patient expresses understanding.  All questions today at this time.  Patient okay to discharge.

## 2024-06-28 NOTE — CARE PLAN
The patient is Watcher - Medium risk of patient condition declining or worsening    Shift Goals  Clinical Goals: iv fluids, nausea/pain control, monitor labs, strict I&Os  Patient Goals: nausea/pain control  Family Goals: N/A    Progress made toward(s) clinical / shift goals: Plan of care and medications discussed and reviewed over with pt. Nausea and abd pain comes in episodes. Prn anti-emetics provided when the symptoms occur. Relief of symptoms after medications administered per pt. Pt hemodynamically stable except for high BP at times. Pt has poor po intake. Drinking fluids and eating foods makes pt more nauseous and more increase of abd pain.    Patient is not progressing towards the following goals:      Problem: Knowledge Deficit - Standard  Goal: Patient and family/care givers will demonstrate understanding of plan of care, disease process/condition, diagnostic tests and medications  Outcome: Progressing     Problem: Pain - Standard  Goal: Alleviation of pain or a reduction in pain to the patient’s comfort goal  Outcome: Progressing     Problem: Hemodynamics  Goal: Patient's hemodynamics, fluid balance and neurologic status will be stable or improve  Outcome: Progressing     Problem: Fluid Volume  Goal: Fluid volume balance will be maintained  Outcome: Progressing

## 2024-06-29 LAB
BACTERIA UR CULT: NORMAL
SIGNIFICANT IND 70042: NORMAL
SITE SITE: NORMAL
SOURCE SOURCE: NORMAL
